# Patient Record
Sex: FEMALE | Race: WHITE | NOT HISPANIC OR LATINO | Employment: FULL TIME | URBAN - METROPOLITAN AREA
[De-identification: names, ages, dates, MRNs, and addresses within clinical notes are randomized per-mention and may not be internally consistent; named-entity substitution may affect disease eponyms.]

---

## 2020-03-16 ENCOUNTER — HOSPITAL ENCOUNTER (EMERGENCY)
Facility: HOSPITAL | Age: 40
Discharge: HOME | End: 2020-03-16
Attending: EMERGENCY MEDICINE

## 2020-03-16 VITALS
BODY MASS INDEX: 26.29 KG/M2 | WEIGHT: 133.94 LBS | SYSTOLIC BLOOD PRESSURE: 132 MMHG | RESPIRATION RATE: 18 BRPM | HEIGHT: 60 IN | HEART RATE: 71 BPM | OXYGEN SATURATION: 98 % | DIASTOLIC BLOOD PRESSURE: 71 MMHG | TEMPERATURE: 99 F

## 2020-03-16 DIAGNOSIS — R21 RASH: Primary | ICD-10-CM

## 2020-03-16 PROCEDURE — 99284 EMERGENCY DEPT VISIT MOD MDM: CPT

## 2020-03-16 RX ORDER — HYDROXYZINE HYDROCHLORIDE 25 MG/1
25 TABLET, FILM COATED ORAL EVERY 6 HOURS
Qty: 30 TABLET | Refills: 0 | Status: SHIPPED | OUTPATIENT
Start: 2020-03-16

## 2020-03-16 RX ORDER — METHYLPREDNISOLONE 4 MG/1
TABLET ORAL
Qty: 1 PACKAGE | Refills: 0 | Status: SHIPPED | OUTPATIENT
Start: 2020-03-16 | End: 2020-04-06

## 2020-03-16 NOTE — ED PROVIDER NOTES
Refer To    IMAGING REFER TO:  [facility],   [address] [place], [place]  Ph: [phone]     ORDER: CT CHEST W CON   Reason for Referral: Osteopenia (M85.80)     Ordered By: JIMBO MONTEMAYOR Required Performing Instructions: dx: f/u lung nodule   CT chest without contrast   [facility]     # of Visits: 1     Signatures   Electronically signed by : Ruth Paul, ; Aug 24 2018 12:34PM CST     SCRIBE #1 NOTE: I, Joyce Gage, am scribing for, and in the presence of, PAPA Whiteside. I have scribed the entire note.      History      Chief Complaint   Patient presents with    General Illness     pt c/o cough, congestion, fever (Tmax 101) that began Thursday, and rash that began today       Review of patient's allergies indicates:   Allergen Reactions    Olanzapine Swelling    Tizanidine Itching        HPI   HPI    3/16/2020, 4:23 PM   History obtained from the patient      History of Present Illness: Criselda Cannon is a 39 y.o. female patient with a PMHx of anxiety, bipolar disorder 1, and depression, who presents to the Emergency Department for and Itchy rash to the back, chest, and bilateral arms which onset x1 day PTA. Symptoms are constant and moderate in severity. No mitigating or exacerbating factors reported. No associated sxs reported. Patient denies any fever, chills, n/v/d, sore throat, congestion, cough, rhinorrhea, HA, and all other sxs at this time. No prior Tx reported. No further complaints or concerns at this time.       Arrival mode: Personal vehicle     PCP: Miguelangel Tamez Jr, MD       Past Medical History:  Past Medical History:   Diagnosis Date    Anxiety     Bipolar 1 disorder     Depression        Past Surgical History:  History reviewed. No pertinent past surgical history.     Family History:  History reviewed. No pertinent family history.     Social History:  Social History     Tobacco Use    Smoking status: Current Every Day Smoker     Packs/day: 1.00   Substance and Sexual Activity    Alcohol use: No    Drug use: Yes    Sexual activity: Yes       ROS   Review of Systems   Constitutional: Negative for chills and fever.   HENT: Negative for congestion, rhinorrhea and sore throat.    Respiratory: Negative for cough and shortness of breath.    Cardiovascular: Negative for chest pain.   Gastrointestinal: Negative for diarrhea, nausea and vomiting.   Genitourinary:  Negative for dysuria.   Musculoskeletal: Negative for back pain.   Skin: Positive for rash (back, chest, bilateral arms).   Neurological: Negative for weakness and headaches.   Hematological: Does not bruise/bleed easily.   All other systems reviewed and are negative.    Physical Exam      Initial Vitals [03/16/20 1617]   BP Pulse Resp Temp SpO2   132/71 71 18 98.9 °F (37.2 °C) 98 %      MAP       --          Physical Exam  Nursing Notes and Vital Signs Reviewed.   Constitutional: Patient is in no acute distress. Well-developed and well-nourished.  Head: Atraumatic. Normocephalic.  Eyes: PERRL. EOM intact. Conjunctivae are not pale. No scleral icterus.  ENT: Mucous membranes are moist. Oropharynx is clear and symmetric.    Neck: Supple. Full ROM. No lymphadenopathy.  Cardiovascular: Regular rate. Regular rhythm. No murmurs, rubs, or gallops. Distal pulses are 2+ and symmetric.  Pulmonary/Chest: No respiratory distress. Clear to auscultation bilaterally. No wheezing or rales.  Abdominal: Soft and non-distended.  There is no tenderness.  No rebound, guarding, or rigidity. Good bowel sounds.  Genitourinary: No CVA tenderness  Musculoskeletal: Moves all extremities. No obvious deformities. No edema. No calf tenderness.  Skin: Warm and dry. Pustular erythematous rash to back, chest, and bilateral arms.  Neurological:  Alert, awake, and appropriate.  Normal speech.  No acute focal neurological deficits are appreciated.  Psychiatric: Normal affect. Good eye contact. Appropriate in content.    ED Course    Procedures  ED Vital Signs:  Vitals:    03/16/20 1617   BP: 132/71   Pulse: 71   Resp: 18   Temp: 98.9 °F (37.2 °C)   TempSrc: Oral   SpO2: 98%   Weight: 60.7 kg (133 lb 14.9 oz)   Height: 5' (1.524 m)       Abnormal Lab Results:  Labs Reviewed   HIV 1 / 2 ANTIBODY        All Lab Results:  Results for orders placed or performed during the hospital encounter of 07/06/13   CBC auto differential   Result Value Ref Range     WBC 9.23 4.8 - 10.8 K/uL    RBC 4.69 4.00 - 5.40 M/uL    Hemoglobin 14.0 12.0 - 16.0 gm/dl    Hematocrit 40.3 37.0 - 48.5 %    Mean Corpuscular Volume 85.9 82 - 95 fL    Mean Corpuscular Hemoglobin 29.9 27 - 31 pg    Mean Corpuscular Hemoglobin Conc 34.7 32 - 36 %    RDW 12.8 11.5 - 14.5 %    Gran% 56.4 38 - 73 %    Lymph% 35.3 21 - 44 %    Mono% 6.9 0.0 - 7.4 %    Eosinophil% 1.2 0.0 - 4.2 %    Basophil% 0.2 0 - 1.9 %    Gran # (ANC) 5.2 1.8 - 7.7 K/uL    Lymph # 3.3 1 - 4.8 K/uL    Mono # 0.6 0.0 - 0.8 K/uL    Eos # 0.1 0 - 0.45 K/uL    Baso # 0.0 0.0 - 0.2 K/uL    Platelets 218 150 - 350 K/uL    MPV 12.2 9.2 - 12.9 fL   Comprehensive metabolic panel   Result Value Ref Range    Glucose 80 70 - 110 mg/dl    BUN, Bld 7 6 - 20 mg/dl    Creatinine 0.8 0.5 - 1.4 mg/dl    Calcium 10.2 8.7 - 10.5 mg/dl    Sodium 140 136 - 145 mmol/L    Potassium 2.9 (L) 3.5 - 5.1 mmol/L    Chloride 104 95 - 110 mmol/L    Total Protein 7.6 6.0 - 8.4 g/dL    Albumin 4.1 3.5 - 5.2 g/dl    Total Bilirubin 0.6 0.1 - 1.0 mg/dl    AST 8 (L) 10 - 40 U/L    Alkaline Phosphatase 83 55 - 135 U/L    CO2 22 (L) 23.0 - 29.0 mmol/L    ALT 6 (L) 10 - 44 U/L    Anion Gap 14 8 - 16 mmol/L    eGFR if non African American >60 >60 mL/min    eGFR if African American >60 >60 mL/min   TSH   Result Value Ref Range    TSH 1.01 0.4 - 4.0 uIU/ml   Urinalysis - clean catch   Result Value Ref Range    Color, UA YELLOW Yellow    Appearance, UA CLEAR Clear    Specific Gravity, UA 1.005 1.005 - 1.030    Leukocytes, UA 1+ (A) Negative    Nitrite, UA Negative Negative    pH, UA 6.5 4.5 - 8.0    Protein, UA Negative Negative mg/dl    Glucose, UA Negative Negative mg/dl    Ketones, UA Negative Negative mg/dl    Bilirubin (UA) Negative Negative    Urobilinogen, urine Negative <4 mg/dL    Occult Blood UA Trace (A) Negative   Drug screen panel, emergency   Result Value Ref Range    Opiates Urine Negative Negative    Cocaine, Urine Presumptive Positive (A) Negative    THC, UR  Negative Negative    Benzodiaz, Urine Negative Negative    Barbiturates, Urine Negative Negative    Amphetamines, Urine Negative Negative   Acetaminophen level   Result Value Ref Range    Acetaminophen (Tylenol), Serum <3.0 (L) 10.0 - 20.0 ug/ml   Salicylate level   Result Value Ref Range    Salicylate Lvl <5.0 (L) 15.0 - 30.0 mg/dl   Pregnancy, urine rapid   Result Value Ref Range    Preg Test, Ur Negative Negative   Ethanol   Result Value Ref Range    Alcohol, Medical, Serum <10 mg/dl   UA Microscopic   Result Value Ref Range    RBC, UA 1-3 0 - 4 /hpf    WBC, UA 5-10 (A) 0 - 5 /hpf    Bacteria Moderate (A) None-Occ    Squam Epithel, UA Moderate /hpf    Amorphous, UA Many (A) Few-Mod   Magnesium   Result Value Ref Range    Magnesium 2.3 1.6 - 2.6 mg/dl   Potassium   Result Value Ref Range    Potassium 4.1 3.5 - 5.1 mmol/L         Imaging Results:  Imaging Results    None                 The Emergency Provider reviewed the vital signs and test results, which are outlined above.    ED Discussion     4:37 PM: Reassessed pt at this time.  Pt states her condition has improved at this time. Discussed with pt all pertinent ED information and results. Discussed pt dx and plan of tx. Gave pt all f/u and return to the ED instructions. All questions and concerns were addressed at this time. Pt expresses understanding of information and instructions, and is comfortable with plan to discharge. Pt is stable for discharge.    I discussed with patient and/or family/caretaker that evaluation in the ED does not suggest any emergent or life threatening medical conditions requiring immediate intervention beyond what was provided in the ED, and I believe patient is safe for discharge.  Regardless, an unremarkable evaluation in the ED does not preclude the development or presence of a serious of life threatening condition. As such, patient was instructed to return immediately for any worsening or change in current symptoms.           ED  Medication(s):  Medications - No data to display    Follow-up Information     Miguelangel Tamez Jr, MD. Go in 2 days.    Specialty:  Family Medicine  Contact information:  [address] [facility]  [address]  [facility] [address] [phone]                   Patient's Medications   New Prescriptions    HYDROXYZINE HCL (ATARAX) 25 MG TABLET    Take 1 tablet (25 mg total) by mouth every 6 (six) hours.    METHYLPREDNISOLONE (MEDROL DOSEPACK) 4 MG TABLET    As directed   Previous Medications    ARIPIPRAZOLE (ABILIFY) 10 MG TAB    Take 5 mg by mouth once daily.    CLONAZEPAM (KLONOPIN) 1 MG TABLET    Take 1 mg by mouth 2 (two) times daily as needed.    MEDROXYPROGESTERONE ACETATE (DEPO-PROVERA IM)    Inject into the muscle.    SERTRALINE (ZOLOFT) 100 MG TABLET    Take 100 mg by mouth once daily.    TRAZODONE (DESYREL) 100 MG TABLET    Take 100 mg by mouth every evening.   Modified Medications    No medications on file   Discontinued Medications    No medications on file        Medication List      START taking these medications    hydroxyzine HCL 25 MG tablet  Commonly known as:  ATARAX  Take 1 tablet (25 mg total) by mouth every 6 (six) hours.     methylPREDNISolone 4 mg tablet  Commonly known as:  MEDROL DOSEPACK  As directed        ASK your doctor about these medications    ABILIFY 10 MG Tab  Generic drug:  ARIPiprazole     DEPO-PROVERA IM     KlonoPIN 1 MG tablet  Generic drug:  clonazePAM     sertraline 100 MG tablet  Commonly known as:  ZOLOFT     traZODone 100 MG tablet  Commonly known as:  DESYREL           Where to Get Your Medications      You can get these medications from any pharmacy    Bring a paper prescription for each of these medications  · hydroxyzine HCL 25 MG tablet  · methylPREDNISolone 4 mg tablet           Medical Decision Making    Medical Decision Making:   Clinical Tests:   Lab Tests: Ordered and Reviewed           Scribe Attestation:   Scribe #1: I performed the above scribed service and the  documentation accurately describes the services I performed. I attest to the accuracy of the note.    Attending:   Physician Attestation Statement for Scribe #1: I, PAPA Whiteside, personally performed the services described in this documentation, as scribed by Joyce Gage, in my presence, and it is both accurate and complete.          Clinical Impression       ICD-10-CM ICD-9-CM   1. Rash R21 782.1       Disposition:   Disposition: Discharged  Condition: Stable         PAPA Whiteside  03/16/20 1485

## 2021-12-30 ENCOUNTER — HOSPITAL ENCOUNTER (EMERGENCY)
Facility: HOSPITAL | Age: 41
Discharge: LEFT BEFORE DISCHARGE | End: 2021-12-30
Payer: MEDICAID

## 2021-12-30 VITALS
BODY MASS INDEX: 24.85 KG/M2 | DIASTOLIC BLOOD PRESSURE: 88 MMHG | TEMPERATURE: 98 F | RESPIRATION RATE: 12 BRPM | HEART RATE: 101 BPM | SYSTOLIC BLOOD PRESSURE: 150 MMHG | WEIGHT: 123.25 LBS | OXYGEN SATURATION: 98 % | HEIGHT: 59 IN

## 2021-12-30 DIAGNOSIS — M25.569 KNEE PAIN: ICD-10-CM

## 2021-12-30 PROCEDURE — 25000003 PHARM REV CODE 250: Performed by: NURSE PRACTITIONER

## 2021-12-30 PROCEDURE — 99283 EMERGENCY DEPT VISIT LOW MDM: CPT | Mod: 25

## 2021-12-30 RX ORDER — KETOROLAC TROMETHAMINE 10 MG/1
10 TABLET, FILM COATED ORAL
Status: COMPLETED | OUTPATIENT
Start: 2021-12-30 | End: 2021-12-30

## 2021-12-30 RX ADMIN — KETOROLAC TROMETHAMINE 10 MG: 10 TABLET, FILM COATED ORAL at 11:12

## 2021-12-31 NOTE — ED NOTES
Attempted to apply knee immobilizer to pt knee at this time she would not sit still to allow application. Another attempt will be made once med has time to work

## 2021-12-31 NOTE — ED NOTES
Attempted to reapply knee immobilizer and give pt crutches pt not in room other pt states pt left

## 2022-01-01 NOTE — ED PROVIDER NOTES
Encounter Date: 12/30/2021       History     Chief Complaint   Patient presents with    Fall     Fell down steps November 21rst. States that she is still having pain to right leg since and now wants to have it looked at.      Pt. C/o right knee pain after a fall several weeks ago.     The history is provided by the patient.   Fall  The accident occurred several weeks ago. The fall occurred while walking. She fell from an unknown height. She was ambulatory at the scene. There was no entrapment after the fall. Pertinent negatives include no back pain, no fever and no nausea. She has tried nothing for the symptoms.     Review of patient's allergies indicates:   Allergen Reactions    Olanzapine Swelling    Tizanidine Itching     Past Medical History:   Diagnosis Date    Anxiety     Bipolar 1 disorder     Depression      No past surgical history on file.  No family history on file.  Social History     Tobacco Use    Smoking status: Current Every Day Smoker     Packs/day: 1.00   Substance Use Topics    Alcohol use: No    Drug use: Yes     Review of Systems   Constitutional: Negative for fever.   HENT: Negative for sore throat.    Respiratory: Negative for shortness of breath.    Cardiovascular: Negative for chest pain.   Gastrointestinal: Negative for nausea.   Genitourinary: Negative for dysuria.   Musculoskeletal: Negative for back pain.   Skin: Negative for rash.   Neurological: Negative for weakness.   Hematological: Does not bruise/bleed easily.       Physical Exam     Initial Vitals [12/30/21 2035]   BP Pulse Resp Temp SpO2   (!) 150/88 101 12 97.9 °F (36.6 °C) 98 %      MAP       --         Physical Exam    Nursing note and vitals reviewed.  Constitutional: She appears well-developed and well-nourished.   HENT:   Head: Normocephalic and atraumatic.   Eyes: Conjunctivae are normal.   Neck:   Normal range of motion.  Cardiovascular: Normal rate.   Pulmonary/Chest: Breath sounds normal. No respiratory  distress. She has no wheezes.   Musculoskeletal:         General: Normal range of motion.      Cervical back: Normal range of motion.      Comments: Right leg ttp on the lateral joint line.  Antalgic gait.      Neurological: She is alert and oriented to person, place, and time.   Skin: Skin is warm and dry.   Psychiatric: She has a normal mood and affect. Her behavior is normal. Thought content normal.         ED Course   Procedures  Labs Reviewed - No data to display       Imaging Results    None          Medications   ketorolac tablet 10 mg (10 mg Oral Given 12/30/21 3250)                          Clinical Impression:   Final diagnoses:  [M25.569] Knee pain          ED Disposition Condition    Eloped               Bakari Lambert NP  01/01/22 0054

## 2022-12-07 ENCOUNTER — HOSPITAL ENCOUNTER (INPATIENT)
Facility: HOSPITAL | Age: 42
LOS: 3 days | DRG: 917 | End: 2022-12-10
Attending: FAMILY MEDICINE | Admitting: INTERNAL MEDICINE
Payer: MEDICAID

## 2022-12-07 DIAGNOSIS — E87.20 LACTIC ACIDOSIS: ICD-10-CM

## 2022-12-07 DIAGNOSIS — J69.0 ASPIRATION PNEUMONIA OF BOTH LUNGS DUE TO VOMIT, UNSPECIFIED PART OF LUNG: ICD-10-CM

## 2022-12-07 DIAGNOSIS — I46.9 CARDIAC ARREST: ICD-10-CM

## 2022-12-07 DIAGNOSIS — R94.31 PROLONGED Q-T INTERVAL ON ECG: ICD-10-CM

## 2022-12-07 DIAGNOSIS — I50.30 (HFPEF) HEART FAILURE WITH PRESERVED EJECTION FRACTION: ICD-10-CM

## 2022-12-07 DIAGNOSIS — G93.1 ANOXIC BRAIN INJURY: Primary | ICD-10-CM

## 2022-12-07 LAB
ABS NEUT CALC (OHS): 4.51 X10(3)/MCL (ref 2.1–9.2)
ALBUMIN SERPL-MCNC: 2.8 GM/DL (ref 3.5–5)
ALBUMIN SERPL-MCNC: 3.2 GM/DL (ref 3.5–5)
ALBUMIN/GLOB SERPL: 1.4 RATIO (ref 1.1–2)
ALBUMIN/GLOB SERPL: 1.5 RATIO (ref 1.1–2)
ALP SERPL-CCNC: 147 UNIT/L (ref 40–150)
ALP SERPL-CCNC: 99 UNIT/L (ref 40–150)
ALT SERPL-CCNC: 152 UNIT/L (ref 0–55)
ALT SERPL-CCNC: 259 UNIT/L (ref 0–55)
AMPHET UR QL SCN: POSITIVE
ANION GAP SERPL CALC-SCNC: 10 MEQ/L
ANION GAP SERPL CALC-SCNC: 12 MEQ/L
ANION GAP SERPL CALC-SCNC: 13 MEQ/L
ANISOCYTOSIS BLD QL SMEAR: ABNORMAL
APPEARANCE UR: CLEAR
AST SERPL-CCNC: 225 UNIT/L (ref 5–34)
AST SERPL-CCNC: 460 UNIT/L (ref 5–34)
B-HCG SERPL QL: NEGATIVE
BACTERIA #/AREA URNS AUTO: ABNORMAL /HPF
BARBITURATE SCN PRESENT UR: NEGATIVE
BENZODIAZ UR QL SCN: NEGATIVE
BILIRUB UR QL STRIP.AUTO: NEGATIVE MG/DL
BILIRUBIN DIRECT+TOT PNL SERPL-MCNC: 0.8 MG/DL
BILIRUBIN DIRECT+TOT PNL SERPL-MCNC: 0.8 MG/DL
BSA FOR ECHO PROCEDURE: 1.48 M2
BUN SERPL-MCNC: 18 MG/DL (ref 7–18.7)
BUN SERPL-MCNC: 21.7 MG/DL (ref 7–18.7)
BUN SERPL-MCNC: 22.3 MG/DL (ref 7–18.7)
BUN SERPL-MCNC: 22.5 MG/DL (ref 7–18.7)
BUN SERPL-MCNC: 22.6 MG/DL (ref 7–18.7)
CALCIUM SERPL-MCNC: 7.1 MG/DL (ref 8.4–10.2)
CALCIUM SERPL-MCNC: 7.2 MG/DL (ref 8.4–10.2)
CALCIUM SERPL-MCNC: 7.3 MG/DL (ref 8.4–10.2)
CALCIUM SERPL-MCNC: 7.4 MG/DL (ref 8.4–10.2)
CALCIUM SERPL-MCNC: 7.6 MG/DL (ref 8.4–10.2)
CANNABINOIDS UR QL SCN: NEGATIVE
CHLORIDE SERPL-SCNC: 106 MMOL/L (ref 98–107)
CHLORIDE SERPL-SCNC: 107 MMOL/L (ref 98–107)
CHLORIDE SERPL-SCNC: 108 MMOL/L (ref 98–107)
CHLORIDE SERPL-SCNC: 109 MMOL/L (ref 98–107)
CHLORIDE SERPL-SCNC: 110 MMOL/L (ref 98–107)
CK MB SERPL-MCNC: 13 NG/ML
CK SERPL-CCNC: 2171 U/L (ref 29–168)
CK SERPL-CCNC: 2526 U/L (ref 29–168)
CK SERPL-CCNC: 2648 U/L (ref 29–168)
CO2 SERPL-SCNC: 19 MMOL/L (ref 22–29)
CO2 SERPL-SCNC: 19 MMOL/L (ref 22–29)
CO2 SERPL-SCNC: 22 MMOL/L (ref 22–29)
COCAINE UR QL SCN: POSITIVE
COLOR UR AUTO: YELLOW
CORRECTED TEMPERATURE (PCO2): 104 MMHG (ref 20–50)
CORRECTED TEMPERATURE (PCO2): 54 MMHG (ref 20–50)
CORRECTED TEMPERATURE (PCO2): 95 MMHG (ref 20–50)
CORRECTED TEMPERATURE (PH): 7.02 (ref 7.3–7.6)
CORRECTED TEMPERATURE (PH): 7.06 (ref 7.3–7.6)
CORRECTED TEMPERATURE (PH): 7.27 (ref 7.3–7.6)
CORRECTED TEMPERATURE (PO2): 101 MMHG (ref 75–100)
CORRECTED TEMPERATURE (PO2): 478 MMHG (ref 75–100)
CORRECTED TEMPERATURE (PO2): 94 MMHG (ref 75–100)
CREAT SERPL-MCNC: 0.57 MG/DL (ref 0.55–1.02)
CREAT SERPL-MCNC: 0.66 MG/DL (ref 0.55–1.02)
CREAT SERPL-MCNC: 0.68 MG/DL (ref 0.55–1.02)
CREAT SERPL-MCNC: 0.74 MG/DL (ref 0.55–1.02)
CREAT SERPL-MCNC: 0.89 MG/DL (ref 0.55–1.02)
CREAT/UREA NIT SERPL: 30
CREAT/UREA NIT SERPL: 32
CREAT/UREA NIT SERPL: 34
EJECTION FRACTION: 55 %
ERYTHROCYTE [DISTWIDTH] IN BLOOD BY AUTOMATED COUNT: 12.3 % (ref 11.5–17)
FENTANYL UR QL SCN: POSITIVE
GFR SERPLBLD CREATININE-BSD FMLA CKD-EPI: >60 MLS/MIN/1.73/M2
GLOBULIN SER-MCNC: 1.9 GM/DL (ref 2.4–3.5)
GLOBULIN SER-MCNC: 2.3 GM/DL (ref 2.4–3.5)
GLUCOSE SERPL-MCNC: 107 MG/DL (ref 74–100)
GLUCOSE SERPL-MCNC: 127 MG/DL (ref 74–100)
GLUCOSE SERPL-MCNC: 153 MG/DL (ref 74–100)
GLUCOSE SERPL-MCNC: 195 MG/DL (ref 74–100)
GLUCOSE SERPL-MCNC: 217 MG/DL (ref 74–100)
GLUCOSE SERPL-MCNC: 217 MG/DL (ref 74–100)
GLUCOSE UR QL STRIP.AUTO: ABNORMAL MG/DL
HCO3 UR-SCNC: 24.5 MMOL/L (ref 22–26)
HCO3 UR-SCNC: 24.8 MMOL/L (ref 22–26)
HCO3 UR-SCNC: 29.4 MMOL/L (ref 22–26)
HCT VFR BLD AUTO: 26.7 % (ref 37–47)
HGB BLD-MCNC: 10.8 G/DL (ref 12–18)
HGB BLD-MCNC: 11.5 G/DL (ref 12–18)
HGB BLD-MCNC: 8.5 G/DL (ref 12–18)
HGB BLD-MCNC: 8.8 GM/DL (ref 12–16)
HYALINE CASTS #/AREA URNS LPF: ABNORMAL /LPF
IMM GRANULOCYTES # BLD AUTO: 0.06 X10(3)/MCL (ref 0–0.04)
IMM GRANULOCYTES NFR BLD AUTO: 0.6 %
KETONES UR QL STRIP.AUTO: ABNORMAL MG/DL
LACTATE SERPL-SCNC: 1.7 MMOL/L (ref 0.5–2.2)
LACTATE SERPL-SCNC: 8.1 MMOL/L (ref 0.5–2.2)
LEUKOCYTE ESTERASE UR QL STRIP.AUTO: NEGATIVE UNIT/L
LYMPHOCYTES NFR BLD MANUAL: 4.9 X10(3)/MCL
LYMPHOCYTES NFR BLD MANUAL: 50 % (ref 13–40)
MAGNESIUM SERPL-MCNC: 1.5 MG/DL (ref 1.6–2.6)
MAGNESIUM SERPL-MCNC: 1.7 MG/DL (ref 1.6–2.6)
MAGNESIUM SERPL-MCNC: 1.8 MG/DL (ref 1.6–2.6)
MAGNESIUM SERPL-MCNC: 2.4 MG/DL (ref 1.6–2.6)
MAGNESIUM SERPL-MCNC: 2.5 MG/DL (ref 1.6–2.6)
MCH RBC QN AUTO: 30.6 PG (ref 27–31)
MCHC RBC AUTO-ENTMCNC: 33 MG/DL (ref 33–36)
MCV RBC AUTO: 92.7 FL (ref 80–94)
MDMA UR QL SCN: NEGATIVE
MONOCYTES NFR BLD MANUAL: 0.39 X10(3)/MCL (ref 0.1–1.3)
MONOCYTES NFR BLD MANUAL: 4 % (ref 2–11)
MUCOUS THREADS URNS QL MICRO: ABNORMAL /LPF
NEUTROPHILS NFR BLD MANUAL: 46 % (ref 47–80)
NITRITE UR QL STRIP.AUTO: NEGATIVE
NRBC BLD AUTO-RTO: 0 %
OPIATES UR QL SCN: POSITIVE
PCO2 BLDA: 104 MMHG (ref 20–50)
PCO2 BLDA: 54 MMHG (ref 20–50)
PCO2 BLDA: 95 MMHG (ref 20–50)
PCP UR QL: NEGATIVE
PH SMN: 7.02 [PH] (ref 7.3–7.6)
PH SMN: 7.06 [PH] (ref 7.3–7.6)
PH SMN: 7.27 [PH] (ref 7.3–7.6)
PH UR STRIP.AUTO: 5 [PH]
PH UR: 5 [PH] (ref 3–11)
PHOSPHATE SERPL-MCNC: 2 MG/DL (ref 2.3–4.7)
PHOSPHATE SERPL-MCNC: 2.2 MG/DL (ref 2.3–4.7)
PHOSPHATE SERPL-MCNC: 3.6 MG/DL (ref 2.3–4.7)
PHOSPHATE SERPL-MCNC: 4.7 MG/DL (ref 2.3–4.7)
PLATELET # BLD AUTO: 239 X10(3)/MCL (ref 130–400)
PLATELET # BLD EST: NORMAL 10*3/UL
PMV BLD AUTO: 11.5 FL (ref 7.4–10.4)
PO2 BLDA: 101 MMHG (ref 75–100)
PO2 BLDA: 478 MMHG (ref 75–100)
PO2 BLDA: 94 MMHG (ref 75–100)
POC BASE DEFICIT: -2.6 MMOL/L (ref -2–2)
POC BASE DEFICIT: -2.8 MMOL/L (ref -2–2)
POC BASE DEFICIT: -6.9 MMOL/L (ref -2–2)
POC COHB: 2 % (ref 0.5–1.5)
POC COHB: 2.4 % (ref 0.5–1.5)
POC COHB: 2.4 % (ref 0.5–1.5)
POC METHB: 0.4 % (ref 0–1.5)
POC METHB: 0.6 % (ref 0–1.5)
POC METHB: 0.7 % (ref 0–1.5)
POC O2HB: 95.6 % (ref 94–100)
POC O2HB: 96.8 % (ref 94–100)
POC O2HB: 96.8 % (ref 94–100)
POC PERFORMED BY: ABNORMAL
POC SATURATED O2: 100 % (ref 90–100)
POC SATURATED O2: 94.6 % (ref 90–100)
POC SATURATED O2: 96.2 % (ref 90–100)
POC TEMPERATURE: 37 C
POC TEMPERATURE: 37 C
POC TEMPERATURE: 37 °C
POCT GLUCOSE: 120 MG/DL (ref 70–110)
POCT GLUCOSE: >500 MG/DL (ref 70–110)
POTASSIUM SERPL-SCNC: 2.5 MMOL/L (ref 3.5–5.1)
POTASSIUM SERPL-SCNC: 3.4 MMOL/L (ref 3.5–5.1)
POTASSIUM SERPL-SCNC: 3.4 MMOL/L (ref 3.5–5.1)
POTASSIUM SERPL-SCNC: 3.6 MMOL/L (ref 3.5–5.1)
POTASSIUM SERPL-SCNC: 3.7 MMOL/L (ref 3.5–5.1)
PROT SERPL-MCNC: 4.7 GM/DL (ref 6.4–8.3)
PROT SERPL-MCNC: 5.5 GM/DL (ref 6.4–8.3)
PROT UR QL STRIP.AUTO: ABNORMAL MG/DL
RBC # BLD AUTO: 2.88 X10(6)/MCL (ref 4.2–5.4)
RBC #/AREA URNS AUTO: >100 /HPF
RBC MORPH BLD: ABNORMAL
RBC UR QL AUTO: ABNORMAL UNIT/L
SODIUM SERPL-SCNC: 140 MMOL/L (ref 136–145)
SODIUM SERPL-SCNC: 140 MMOL/L (ref 136–145)
SODIUM SERPL-SCNC: 141 MMOL/L (ref 136–145)
SODIUM SERPL-SCNC: 141 MMOL/L (ref 136–145)
SODIUM SERPL-SCNC: 146 MMOL/L (ref 136–145)
SP GR UR STRIP.AUTO: 1.01
SPECIFIC GRAVITY, URINE AUTO (.000) (OHS): 1.01 (ref 1–1.03)
SPECIMEN SOURCE: ABNORMAL
SQUAMOUS #/AREA URNS LPF: ABNORMAL /HPF
TROPONIN I SERPL-MCNC: 0.05 NG/ML (ref 0–0.04)
TROPONIN I SERPL-MCNC: 0.12 NG/ML (ref 0–0.04)
TROPONIN I SERPL-MCNC: 0.22 NG/ML (ref 0–0.04)
TROPONIN I SERPL-MCNC: <0.01 NG/ML (ref 0–0.04)
UNIDENT CRYS #/AREA URNS HPF: ABNORMAL /HPF
UROBILINOGEN UR STRIP-ACNC: NORMAL MG/DL
WBC # SPEC AUTO: 9.8 X10(3)/MCL (ref 4.5–11.5)
WBC #/AREA URNS AUTO: ABNORMAL /HPF

## 2022-12-07 PROCEDURE — 84100 ASSAY OF PHOSPHORUS: CPT | Performed by: STUDENT IN AN ORGANIZED HEALTH CARE EDUCATION/TRAINING PROGRAM

## 2022-12-07 PROCEDURE — 94002 VENT MGMT INPAT INIT DAY: CPT

## 2022-12-07 PROCEDURE — 96374 THER/PROPH/DIAG INJ IV PUSH: CPT

## 2022-12-07 PROCEDURE — 82962 GLUCOSE BLOOD TEST: CPT

## 2022-12-07 PROCEDURE — 82553 CREATINE MB FRACTION: CPT | Performed by: FAMILY MEDICINE

## 2022-12-07 PROCEDURE — 94761 N-INVAS EAR/PLS OXIMETRY MLT: CPT

## 2022-12-07 PROCEDURE — 84703 CHORIONIC GONADOTROPIN ASSAY: CPT | Performed by: STUDENT IN AN ORGANIZED HEALTH CARE EDUCATION/TRAINING PROGRAM

## 2022-12-07 PROCEDURE — 85027 COMPLETE CBC AUTOMATED: CPT | Performed by: FAMILY MEDICINE

## 2022-12-07 PROCEDURE — 63600175 PHARM REV CODE 636 W HCPCS: Performed by: STUDENT IN AN ORGANIZED HEALTH CARE EDUCATION/TRAINING PROGRAM

## 2022-12-07 PROCEDURE — 99223 1ST HOSP IP/OBS HIGH 75: CPT | Mod: ,,, | Performed by: INTERNAL MEDICINE

## 2022-12-07 PROCEDURE — 36415 COLL VENOUS BLD VENIPUNCTURE: CPT | Performed by: INTERNAL MEDICINE

## 2022-12-07 PROCEDURE — 95822 EEG COMA OR SLEEP ONLY: CPT | Mod: 26,,, | Performed by: PSYCHIATRY & NEUROLOGY

## 2022-12-07 PROCEDURE — 20000000 HC ICU ROOM

## 2022-12-07 PROCEDURE — C1751 CATH, INF, PER/CENT/MIDLINE: HCPCS

## 2022-12-07 PROCEDURE — 83520 IMMUNOASSAY QUANT NOS NONAB: CPT | Performed by: STUDENT IN AN ORGANIZED HEALTH CARE EDUCATION/TRAINING PROGRAM

## 2022-12-07 PROCEDURE — 63600175 PHARM REV CODE 636 W HCPCS

## 2022-12-07 PROCEDURE — 63600175 PHARM REV CODE 636 W HCPCS: Performed by: FAMILY MEDICINE

## 2022-12-07 PROCEDURE — 87040 BLOOD CULTURE FOR BACTERIA: CPT | Performed by: FAMILY MEDICINE

## 2022-12-07 PROCEDURE — 93005 ELECTROCARDIOGRAM TRACING: CPT

## 2022-12-07 PROCEDURE — 80307 DRUG TEST PRSMV CHEM ANLYZR: CPT | Performed by: FAMILY MEDICINE

## 2022-12-07 PROCEDURE — 83605 ASSAY OF LACTIC ACID: CPT | Performed by: FAMILY MEDICINE

## 2022-12-07 PROCEDURE — 31500 INSERT EMERGENCY AIRWAY: CPT

## 2022-12-07 PROCEDURE — 82550 ASSAY OF CK (CPK): CPT | Performed by: FAMILY MEDICINE

## 2022-12-07 PROCEDURE — 25000003 PHARM REV CODE 250: Performed by: STUDENT IN AN ORGANIZED HEALTH CARE EDUCATION/TRAINING PROGRAM

## 2022-12-07 PROCEDURE — 99900035 HC TECH TIME PER 15 MIN (STAT)

## 2022-12-07 PROCEDURE — 81001 URINALYSIS AUTO W/SCOPE: CPT | Performed by: STUDENT IN AN ORGANIZED HEALTH CARE EDUCATION/TRAINING PROGRAM

## 2022-12-07 PROCEDURE — 84484 ASSAY OF TROPONIN QUANT: CPT | Performed by: FAMILY MEDICINE

## 2022-12-07 PROCEDURE — 82550 ASSAY OF CK (CPK): CPT | Performed by: STUDENT IN AN ORGANIZED HEALTH CARE EDUCATION/TRAINING PROGRAM

## 2022-12-07 PROCEDURE — 80053 COMPREHEN METABOLIC PANEL: CPT | Performed by: FAMILY MEDICINE

## 2022-12-07 PROCEDURE — 95822 PR EEG,COMA/SLEEP RECORD ONLY: ICD-10-PCS | Mod: 26,,, | Performed by: PSYCHIATRY & NEUROLOGY

## 2022-12-07 PROCEDURE — 25000003 PHARM REV CODE 250

## 2022-12-07 PROCEDURE — 36569 INSJ PICC 5 YR+ W/O IMAGING: CPT

## 2022-12-07 PROCEDURE — 96361 HYDRATE IV INFUSION ADD-ON: CPT

## 2022-12-07 PROCEDURE — 80053 COMPREHEN METABOLIC PANEL: CPT | Performed by: STUDENT IN AN ORGANIZED HEALTH CARE EDUCATION/TRAINING PROGRAM

## 2022-12-07 PROCEDURE — 51702 INSERT TEMP BLADDER CATH: CPT

## 2022-12-07 PROCEDURE — 82803 BLOOD GASES ANY COMBINATION: CPT

## 2022-12-07 PROCEDURE — 36600 WITHDRAWAL OF ARTERIAL BLOOD: CPT

## 2022-12-07 PROCEDURE — 63600175 PHARM REV CODE 636 W HCPCS: Performed by: INTERNAL MEDICINE

## 2022-12-07 PROCEDURE — 95816 EEG AWAKE AND DROWSY: CPT

## 2022-12-07 PROCEDURE — 83735 ASSAY OF MAGNESIUM: CPT | Performed by: FAMILY MEDICINE

## 2022-12-07 PROCEDURE — 99900026 HC AIRWAY MAINTENANCE (STAT)

## 2022-12-07 PROCEDURE — 87088 URINE BACTERIA CULTURE: CPT | Performed by: STUDENT IN AN ORGANIZED HEALTH CARE EDUCATION/TRAINING PROGRAM

## 2022-12-07 PROCEDURE — 36415 COLL VENOUS BLD VENIPUNCTURE: CPT | Performed by: STUDENT IN AN ORGANIZED HEALTH CARE EDUCATION/TRAINING PROGRAM

## 2022-12-07 PROCEDURE — 25000003 PHARM REV CODE 250: Performed by: FAMILY MEDICINE

## 2022-12-07 PROCEDURE — 82947 ASSAY GLUCOSE BLOOD QUANT: CPT | Performed by: STUDENT IN AN ORGANIZED HEALTH CARE EDUCATION/TRAINING PROGRAM

## 2022-12-07 PROCEDURE — 27000221 HC OXYGEN, UP TO 24 HOURS

## 2022-12-07 PROCEDURE — 83735 ASSAY OF MAGNESIUM: CPT | Performed by: STUDENT IN AN ORGANIZED HEALTH CARE EDUCATION/TRAINING PROGRAM

## 2022-12-07 PROCEDURE — 99292 CRITICAL CARE ADDL 30 MIN: CPT

## 2022-12-07 PROCEDURE — 25000003 PHARM REV CODE 250: Performed by: INTERNAL MEDICINE

## 2022-12-07 PROCEDURE — 27200966 HC CLOSED SUCTION SYSTEM

## 2022-12-07 PROCEDURE — 99291 CRITICAL CARE FIRST HOUR: CPT | Mod: 25

## 2022-12-07 PROCEDURE — 84484 ASSAY OF TROPONIN QUANT: CPT | Performed by: STUDENT IN AN ORGANIZED HEALTH CARE EDUCATION/TRAINING PROGRAM

## 2022-12-07 PROCEDURE — A4216 STERILE WATER/SALINE, 10 ML: HCPCS | Performed by: STUDENT IN AN ORGANIZED HEALTH CARE EDUCATION/TRAINING PROGRAM

## 2022-12-07 PROCEDURE — 99223 PR INITIAL HOSPITAL CARE,LEVL III: ICD-10-PCS | Mod: ,,, | Performed by: INTERNAL MEDICINE

## 2022-12-07 RX ORDER — SODIUM CHLORIDE 9 MG/ML
INJECTION, SOLUTION INTRAVENOUS CONTINUOUS
Status: DISCONTINUED | OUTPATIENT
Start: 2022-12-07 | End: 2022-12-07

## 2022-12-07 RX ORDER — SODIUM CHLORIDE 0.9 % (FLUSH) 0.9 %
10 SYRINGE (ML) INJECTION
Status: DISCONTINUED | OUTPATIENT
Start: 2022-12-07 | End: 2022-12-12 | Stop reason: HOSPADM

## 2022-12-07 RX ORDER — KETAMINE HCL IN 0.9 % NACL 50 MG/5 ML
100 SYRINGE (ML) INTRAVENOUS
Status: COMPLETED | OUTPATIENT
Start: 2022-12-07 | End: 2022-12-07

## 2022-12-07 RX ORDER — LEVETIRACETAM 5 MG/ML
1000 INJECTION INTRAVASCULAR
Status: COMPLETED | OUTPATIENT
Start: 2022-12-07 | End: 2022-12-07

## 2022-12-07 RX ORDER — MUPIROCIN 20 MG/G
OINTMENT TOPICAL 2 TIMES DAILY
Status: COMPLETED | OUTPATIENT
Start: 2022-12-07 | End: 2022-12-11

## 2022-12-07 RX ORDER — PROPOFOL 10 MG/ML
0-50 INJECTION, EMULSION INTRAVENOUS CONTINUOUS
Status: DISCONTINUED | OUTPATIENT
Start: 2022-12-07 | End: 2022-12-07

## 2022-12-07 RX ORDER — MAGNESIUM SULFATE HEPTAHYDRATE 40 MG/ML
0.5 INJECTION, SOLUTION INTRAVENOUS CONTINUOUS
Status: DISCONTINUED | OUTPATIENT
Start: 2022-12-07 | End: 2022-12-07

## 2022-12-07 RX ORDER — KETAMINE HCL IN 0.9 % NACL 50 MG/5 ML
SYRINGE (ML) INTRAVENOUS
Status: COMPLETED
Start: 2022-12-07 | End: 2022-12-07

## 2022-12-07 RX ORDER — SUCCINYLCHOLINE CHLORIDE 20 MG/ML
80 INJECTION INTRAMUSCULAR; INTRAVENOUS
Status: COMPLETED | OUTPATIENT
Start: 2022-12-07 | End: 2022-12-07

## 2022-12-07 RX ORDER — LEVETIRACETAM 5 MG/ML
1000 INJECTION INTRAVASCULAR EVERY 12 HOURS
Status: DISCONTINUED | OUTPATIENT
Start: 2022-12-07 | End: 2022-12-12 | Stop reason: HOSPADM

## 2022-12-07 RX ORDER — SUCCINYLCHOLINE CHLORIDE 20 MG/ML
INJECTION INTRAMUSCULAR; INTRAVENOUS
Status: COMPLETED
Start: 2022-12-07 | End: 2022-12-07

## 2022-12-07 RX ORDER — ENOXAPARIN SODIUM 100 MG/ML
40 INJECTION SUBCUTANEOUS EVERY 24 HOURS
Status: DISCONTINUED | OUTPATIENT
Start: 2022-12-07 | End: 2022-12-12 | Stop reason: HOSPADM

## 2022-12-07 RX ORDER — POTASSIUM CHLORIDE 7.45 MG/ML
10 INJECTION INTRAVENOUS
Status: COMPLETED | OUTPATIENT
Start: 2022-12-07 | End: 2022-12-07

## 2022-12-07 RX ORDER — MAGNESIUM SULFATE HEPTAHYDRATE 40 MG/ML
2 INJECTION, SOLUTION INTRAVENOUS ONCE
Status: COMPLETED | OUTPATIENT
Start: 2022-12-07 | End: 2022-12-07

## 2022-12-07 RX ORDER — FAMOTIDINE 10 MG/ML
20 INJECTION INTRAVENOUS 2 TIMES DAILY
Status: DISCONTINUED | OUTPATIENT
Start: 2022-12-07 | End: 2022-12-12 | Stop reason: HOSPADM

## 2022-12-07 RX ORDER — SODIUM CHLORIDE 0.9 % (FLUSH) 0.9 %
10 SYRINGE (ML) INJECTION EVERY 6 HOURS
Status: DISCONTINUED | OUTPATIENT
Start: 2022-12-07 | End: 2022-12-12 | Stop reason: HOSPADM

## 2022-12-07 RX ORDER — SODIUM CHLORIDE, SODIUM LACTATE, POTASSIUM CHLORIDE, CALCIUM CHLORIDE 600; 310; 30; 20 MG/100ML; MG/100ML; MG/100ML; MG/100ML
INJECTION, SOLUTION INTRAVENOUS CONTINUOUS
Status: DISCONTINUED | OUTPATIENT
Start: 2022-12-07 | End: 2022-12-08

## 2022-12-07 RX ORDER — PROPOFOL 10 MG/ML
0-50 INJECTION, EMULSION INTRAVENOUS CONTINUOUS
Status: DISCONTINUED | OUTPATIENT
Start: 2022-12-07 | End: 2022-12-12 | Stop reason: HOSPADM

## 2022-12-07 RX ADMIN — VANCOMYCIN HYDROCHLORIDE 1250 MG: 1 INJECTION, POWDER, LYOPHILIZED, FOR SOLUTION INTRAVENOUS at 06:12

## 2022-12-07 RX ADMIN — LEVETIRACETAM INJECTION 1000 MG: 5 INJECTION INTRAVENOUS at 08:12

## 2022-12-07 RX ADMIN — PIPERACILLIN AND TAZOBACTAM 4.5 G: 4; .5 INJECTION, POWDER, LYOPHILIZED, FOR SOLUTION INTRAVENOUS; PARENTERAL at 07:12

## 2022-12-07 RX ADMIN — POTASSIUM CHLORIDE 10 MEQ: 7.46 INJECTION, SOLUTION INTRAVENOUS at 09:12

## 2022-12-07 RX ADMIN — LEVETIRACETAM INJECTION 1000 MG: 5 INJECTION INTRAVENOUS at 04:12

## 2022-12-07 RX ADMIN — Medication 100 MG: at 05:12

## 2022-12-07 RX ADMIN — PROPOFOL 5 MCG/KG/MIN: 10 INJECTION, EMULSION INTRAVENOUS at 12:12

## 2022-12-07 RX ADMIN — MAGNESIUM SULFATE HEPTAHYDRATE 2 G: 40 INJECTION, SOLUTION INTRAVENOUS at 07:12

## 2022-12-07 RX ADMIN — POTASSIUM CHLORIDE 10 MEQ: 7.46 INJECTION, SOLUTION INTRAVENOUS at 11:12

## 2022-12-07 RX ADMIN — SODIUM CHLORIDE 2000 ML: 9 INJECTION, SOLUTION INTRAVENOUS at 02:12

## 2022-12-07 RX ADMIN — FAMOTIDINE 20 MG: 10 INJECTION, SOLUTION INTRAVENOUS at 08:12

## 2022-12-07 RX ADMIN — PIPERACILLIN AND TAZOBACTAM 4.5 G: 4; .5 INJECTION, POWDER, LYOPHILIZED, FOR SOLUTION INTRAVENOUS; PARENTERAL at 01:12

## 2022-12-07 RX ADMIN — PROPOFOL 40 MCG/KG/MIN: 10 INJECTION, EMULSION INTRAVENOUS at 07:12

## 2022-12-07 RX ADMIN — SODIUM CHLORIDE, PRESERVATIVE FREE 10 ML: 5 INJECTION INTRAVENOUS at 11:12

## 2022-12-07 RX ADMIN — SODIUM CHLORIDE, PRESERVATIVE FREE 10 ML: 5 INJECTION INTRAVENOUS at 05:12

## 2022-12-07 RX ADMIN — PIPERACILLIN AND TAZOBACTAM 4.5 G: 4; .5 INJECTION, POWDER, LYOPHILIZED, FOR SOLUTION INTRAVENOUS; PARENTERAL at 09:12

## 2022-12-07 RX ADMIN — SUCCINYLCHOLINE CHLORIDE 80 MG: 20 INJECTION, SOLUTION INTRAMUSCULAR; INTRAVENOUS at 05:12

## 2022-12-07 RX ADMIN — ENOXAPARIN SODIUM 40 MG: 40 INJECTION SUBCUTANEOUS at 05:12

## 2022-12-07 RX ADMIN — MUPIROCIN: 20 OINTMENT TOPICAL at 09:12

## 2022-12-07 RX ADMIN — SUCCINYLCHOLINE CHLORIDE 80 MG: 20 INJECTION INTRAMUSCULAR; INTRAVENOUS at 05:12

## 2022-12-07 RX ADMIN — SODIUM BICARBONATE: 84 INJECTION, SOLUTION INTRAVENOUS at 06:12

## 2022-12-07 RX ADMIN — POTASSIUM CHLORIDE 10 MEQ: 7.46 INJECTION, SOLUTION INTRAVENOUS at 07:12

## 2022-12-07 RX ADMIN — POTASSIUM PHOSPHATE, MONOBASIC AND POTASSIUM PHOSPHATE, DIBASIC 15 MMOL: 224; 236 INJECTION, SOLUTION, CONCENTRATE INTRAVENOUS at 07:12

## 2022-12-07 RX ADMIN — VANCOMYCIN HYDROCHLORIDE 750 MG: 750 INJECTION, POWDER, LYOPHILIZED, FOR SOLUTION INTRAVENOUS at 05:12

## 2022-12-07 RX ADMIN — POTASSIUM CHLORIDE 10 MEQ: 7.46 INJECTION, SOLUTION INTRAVENOUS at 10:12

## 2022-12-07 RX ADMIN — SODIUM CHLORIDE, POTASSIUM CHLORIDE, SODIUM LACTATE AND CALCIUM CHLORIDE: 600; 310; 30; 20 INJECTION, SOLUTION INTRAVENOUS at 06:12

## 2022-12-07 RX ADMIN — POTASSIUM CHLORIDE 10 MEQ: 7.46 INJECTION, SOLUTION INTRAVENOUS at 06:12

## 2022-12-07 RX ADMIN — POTASSIUM CHLORIDE 10 MEQ: 7.46 INJECTION, SOLUTION INTRAVENOUS at 04:12

## 2022-12-07 RX ADMIN — MUPIROCIN: 20 OINTMENT TOPICAL at 08:12

## 2022-12-07 RX ADMIN — POTASSIUM CHLORIDE 10 MEQ: 7.46 INJECTION, SOLUTION INTRAVENOUS at 08:12

## 2022-12-07 RX ADMIN — SODIUM CHLORIDE: 9 INJECTION, SOLUTION INTRAVENOUS at 02:12

## 2022-12-07 RX ADMIN — NOREPINEPHRINE BITARTRATE 0.02 MCG/KG/MIN: 1 INJECTION, SOLUTION, CONCENTRATE INTRAVENOUS at 02:12

## 2022-12-07 NOTE — PROGRESS NOTES
Pharmacokinetic Initial Assessment: IV Vancomycin    Assessment/Plan:    Initiate intravenous vancomycin with loading dose of 750 mg once followed by a maintenance dose of vancomycin 750 mg IV every 12 hours  Desired empiric serum trough concentration is 10 to 20 mcg/mL  Draw vancomycin random level on 12/08 at 1700.  Pharmacy will continue to follow and monitor vancomycin.      Please contact pharmacy at extension [phone] with any questions regarding this assessment.     Thank you for the consult,   Bryant Potts       Patient brief summary:  Criselda Cannon is a 42 y.o. female initiated on antimicrobial therapy with IV Vancomycin for treatment of suspected lower respiratory infection    Drug Allergies:   Review of patient's allergies indicates:   Allergen Reactions    Olanzapine Swelling    Tizanidine Itching       Actual Body Weight:   49.9 kg    Renal Function:   Estimated Creatinine Clearance: 87.5 mL/min (based on SCr of 0.66 mg/dL).,       CBC (last 72 hours):  Recent Labs   Lab Result Units 12/07/22  0235   WBC x10(3)/mcL 9.8   Hgb gm/dL 8.8*   Hct % 26.7*   Platelet x10(3)/mcL 239   Monocyte Man % 4       Metabolic Panel (last 72 hours):  Recent Labs   Lab Result Units 12/07/22  0235 12/07/22  0445 12/07/22  0608 12/07/22  0624 12/07/22  0956 12/07/22  1209   Sodium Level mmol/L 146*  --  140  --  140 141   Potassium Level mmol/L 2.5*  --  3.7  --  3.4* 3.6   Chloride mmol/L 106  --  108*  --  107 109*   Carbon Dioxide mmol/L 22  --  19*  --  22 22   Glucose Level mg/dL 127* 217* 217*  --  195* 153*   Glucose, UA mg/dL  --   --   --  2+*  --   --    Blood Urea Nitrogen mg/dL 21.7*  --  22.5*  --  22.6* 22.3*   Creatinine mg/dL 0.89  --  0.74  --  0.68 0.66   Albumin Level gm/dL 2.8*  --   --   --  3.2*  --    Bilirubin Total mg/dL 0.8  --   --   --  0.8  --    Alkaline Phosphatase unit/L 99  --   --   --  147  --    Aspartate Aminotransferase unit/L 225*  --   --   --  460*  --    Alanine Aminotransferase  unit/L 152*  --   --   --  259*  --    Magnesium Level mg/dL 2.50  --  1.80  --  1.70 1.50*   Phosphorus Level mg/dL  --   --  4.7  --  2.2* 2.0*       Drug levels (last 3 results):  No results for input(s): VANCOMYCINRA, VANCORANDOM, VANCOMYCINPE, VANCOPEAK, VANCOMYCINTR, VANCOTROUGH in the last 72 hours.    Microbiologic Results:  Microbiology Results (last 7 days)       Procedure Component Value Units Date/Time    Urine culture [[id number]] Collected: 12/07/22 0624    Order Status: Sent Specimen: Urine, Catheterized Updated: 12/07/22 0635    Blood Culture [[id number]] Collected: 12/07/22 0236    Order Status: Resulted Specimen: Blood from Hand, Left Updated: 12/07/22 0238    Blood Culture [[id number]] Collected: 12/07/22 0236    Order Status: Resulted Specimen: Blood from Arm, Right Updated: 12/07/22 0238

## 2022-12-07 NOTE — PROGRESS NOTES
Inpatient Nutrition Assessment    Admit Date: 12/7/2022   Total duration of encounter: 1 day     Nutrition Recommendation/Prescription     Pt NPO/ S/P cardiac arrest. Currently no propofol. When stable --suggest use enteral feeds--Peptamen AF @ 20ml/hr; increase as tolerated to goal rate 45ml/hr (23 hr cycle) to provide 1242 calories, 79 gm protein, 840 ml free water.   When extubated--ADAT to regular /boost tid  MVI/fe  Biweekly wt  Will monitor nutrition status     Communication of Recommendations: reviewed with nurse    Nutrition Assessment     Malnutrition Assessment/Nutrition-Focused Physical Exam    Malnutrition in the context of acute illness or injury  Degree of Malnutrition: unable to complete  Energy Intake: unable to obtain  Interpretation of Weight Loss: unable to obtain  Body Fat:unable to obtain  Area of Body Fat Loss: unable to obtain  Muscle Mass Loss: unable to obtain  Area of Muscle Mass Loss: unable to obtain  Fluid Accumulation: unable to obtain  Edema: unable to obtain   Reduced  Strength: unable to obtain  A minimum of two characteristics is recommended for diagnosis of either severe or non-severe malnutrition.    Chart Review    Reason Seen: continuous nutrition monitoring and malnutrition screening tool    Diagnosis:  Cardiac Arrest, Anoxic Brain Injury, lactic acidosis, prolonged QT/ECG; aspiration PNA 2 vomit     Relevant Medical History: drug abuse, anxiety, bipolar depression     Nutrition-Related Medications: IVF LR @ 100ml/hr, levophed, famotidine, zosyn, Kcl, vancomycin   Calorie Containing IV Medications: no significant kcals from medications at this time    Nutrition-Related Labs:  (12-7) Gluc 217(H) Bun 22 Cr 0.7 K 3.7     Diet/PN Order: Diet NPO  Oral Supplement Order: none  Tube Feeding Order: none  Appetite/Oral Intake: NPO/NPO  Factors Affecting Nutritional Intake: impaired cognitive status/motor control, NPO, and on mechanical ventilation  Food/Tenriism/Cultural  "Preferences: unable to obtain  Food Allergies: unable to obtain       Wound(s):   none documented     Comments    (12/7) Received consult for MST trigger. Pt intubated; S/P code; currently having EEG done at bedside; suspected anoxic brain injury. No diet/or recent wt hx available. Unable to complete PA at this time 2 to EEG being completed. Pt has NGT; TF recs provided.     Anthropometrics    Height: 5' 2" (157.5 cm)    Last Weight: 50.1 kg (110 lb 7.2 oz) (12/07/22 0637) Weight Method: Bed Scale  BMI (Calculated): 20.2  BMI Classification: normal (BMI 18.5-24.9)     Ideal Body Weight (IBW), Female: 110 lb     % Ideal Body Weight, Female (lb): 100.41 %                    Usual Body Weight (UBW), kg:  (pt on vent; unable to obtain)        Usual Weight Provided By: EMR weight history    Wt Readings from Last 5 Encounters:   12/07/22 50.1 kg (110 lb 7.2 oz)   12/30/21 55.9 kg (123 lb 3.8 oz)   03/16/20 60.7 kg (133 lb 14.9 oz)   07/06/13 45.4 kg (100 lb)     Weight Change(s) Since Admission:  Admit Weight: 52.2 kg (115 lb) (12/07/22 0219)  (12/7) no recent wt hx; approx 10% wt loss over past year as per EMR.    Estimated Needs    Weight Used For Calorie Calculations: 50.1 kg (110 lb 7.2 oz)  Energy Calorie Requirements (kcal): 1252 kcal/d; 25 xenia/kg on vent  Energy Need Method: Kcal/kg  Weight Used For Protein Calculations: 50.1 kg (110 lb 7.2 oz)  Protein Requirements: 75 gm protein/d; 1.5 gm/kg  Fluid Requirements (mL): 1252 ml/d; 1ml/xenia  Temp: (!) 93 °F (33.9 °C)       Enteral Nutrition    Patient not receiving enteral nutrition at this time.    Parenteral Nutrition    Patient not receiving parenteral nutrition support at this time.    Evaluation of Received Nutrient Intake    Calories: not meeting estimated needs  Protein: not meeting estimated needs    Patient Education    Not applicable.    Nutrition Diagnosis     PES: Inadequate oral intake related to cardiac arrest/vent as evidenced by NPO. " (new)    Interventions/Goals     Intervention(s): general/healthful diet, modified composition of enteral nutrition, modified rate of enteral nutrition, multivitamin/mineral supplement therapy, and collaboration with other providers  Goal: Meet greater than 75% of nutritional needs by follow-up. (new)    Monitoring & Evaluation     Dietitian will monitor food and beverage intake, enteral nutrition intake, and weight.  Nutrition Risk/Follow-Up: moderate (follow-up in 3-5 days)   Please consult if re-assessment needed sooner.

## 2022-12-07 NOTE — ED PROVIDER NOTES
Encounter Date: 12/7/2022       History     Chief Complaint   Patient presents with    Cardiac Arrest     42-year-old female presents emergency room brought in in cardiac arrest.  [place], per the individual who brought her to the emergency room.  He reports that he is only acquaintance, and currently living in his van at the campground.  He felt bad for this lady for sleeping in a tent, and was allowing her to sleep within his van.  He reports going to take a shower at the local facilities, and when returned, found the patient to be unresponsive.  Reports patient has a history of drug abuse.  He reports she was not breathing, and began administering CPR.  He then drove her here to the emergency room intermittently providing CPR; reports the campground about 20-30 minutes away (20-30 minutes that the patient has been down).  Patient arrived in cardiac arrest without a pulse with vomit on her shirt.    Upon presentation, CPR was begun, IV access difficult to initially obtain so an IO was placed in the right humerus to provide medications.  On rhythm check, patient was in PE a until ultimately ROSC was achieved.  Prior to receiving ROSC, patient was intubated by myself.  Patient had significant vomit content within her mouth that was thick, requiring significant suctioning and manual removal in order to get down to the level of the epiglottis.  As much material as possible was removed prior to intubation.  ROSC was achieved after few more rounds following intubation.    Contact information for gentle mentioned above who brought the patient to the ER for any additional questions:  Sushil Bill  [phone]      Review of patient's allergies indicates:   Allergen Reactions    Olanzapine Swelling    Tizanidine Itching     Past Medical History:   Diagnosis Date    Anxiety     Bipolar 1 disorder     Depression      History reviewed. No pertinent surgical history.  History  reviewed. No pertinent family history.  Social History     Tobacco Use    Smoking status: Every Day     Packs/day: 1.00     Types: Cigarettes   Substance Use Topics    Alcohol use: No    Drug use: Yes     Review of Systems   Unable to perform ROS: Acuity of condition     Physical Exam     Initial Vitals   BP Pulse Resp Temp SpO2   12/07/22 0216 12/07/22 0216 12/07/22 0226 -- 12/07/22 0206   (!) 61/28 103 18  100 %      MAP       --                Physical Exam    Nursing note and vitals reviewed.  HENT:   Head: Normocephalic.   Ecchymosis of right lower eyelid - appears old; no soft tissue swelling appreciated   Eyes:   Pupils dilated and fixed   Cardiovascular:            asystole   Pulmonary/Chest: She has no wheezes. She has no rales.   Equal breath sounds bilaterally when receiving breaths.   Abdominal: Abdomen is soft. Bowel sounds are normal. She exhibits no distension.     Neurological: She is unresponsive. GCS eye subscore is 1. GCS motor subscore is 1.       ED Course   Intubation    Date/Time: 12/7/2022 2:06 AM  Location procedure was performed: [facility] EMERGENCY DEPARTMENT  Performed by: Jassi Hanson MD  Authorized by: Jassi Hanson MD   Pre-operative diagnosis: Cardiac and respiratory arrest  Post-operative diagnosis: Cardiac and respiratory arrest  Consent Done: Emergent Situation  Indications: respiratory distress, hypoxemia, respiratory failure and airway protection  Intubation method: direct  Patient status: unconscious  Preoxygenation: bag valve mask  Paralytic: none  Laryngoscope size: Coombs 3  Tube size: 7.5 mm  Tube type: cuffed  Number of attempts: 2 (Initially, unable to visualize due to vomitis and area suctioned vomitus during first attempt.)  Ventilation between attempts: BVM  Cords visualized: yes  Post-procedure assessment: chest rise, ETCO2 monitor and CO2 detector  Breath sounds: equal and absent over the epigastrium  Cuff inflated: yes  ETT to lip: 23 cm  Tube secured  with: ETT espinal  Chest x-ray interpreted by me.  Chest x-ray findings: endotracheal tube in appropriate position  Patient tolerance: Patient tolerated the procedure well with no immediate complications  Complications: No      ET Tube Exchage - 7.5 to 8.5    Date/Time: 12/7/2022 5:15 AM  Location procedure was performed: [facility] EMERGENCY DEPARTMENT  Performed by: Jassi Hanson MD  Authorized by: Jassi Hanson MD   Pre-operative diagnosis: Difficulty with suctioning due to thick vomitus  Post-operative diagnosis: Difficulty with suctioning due to thick vomitus s/p larger tube placed.  Indications: pulmonary toilet  Intubation method: video-assisted  Patient status: paralyzed (RSI)  Sedatives: ketmine  Paralytic: succinylcholine  Laryngoscope size: Glide 3  Tube size: 8.5 mm  Tube type: cuffed  Number of attempts: 1  Cords visualized: yes  Post-procedure assessment: chest rise, ETCO2 monitor and CO2 detector  Breath sounds: equal and absent over the epigastrium and rales/crackles  Cuff inflated: yes  ETT to lip: 24 cm  Tube secured with: ETT espinal  Chest x-ray interpreted by me.  Chest x-ray findings: endotracheal tube in appropriate position  Patient tolerance: Patient tolerated the procedure well with no immediate complications  Complications: No      Critical Care    Date/Time: 12/7/2022 5:58 AM  Performed by: Jassi Hanson MD  Authorized by: Jassi Hanson MD   Direct patient critical care time: 50 minutes  Additional history critical care time: 10 minutes  Ordering / reviewing critical care time: 15 minutes  Documentation critical care time: 30 minutes  Consulting other physicians critical care time: 10 minutes  Total critical care time (exclusive of procedural time) : 115 minutes  Critical care time was exclusive of separately billable procedures and treating other patients and teaching time.  Critical care was necessary to treat or prevent imminent or life-threatening deterioration  of the following conditions: CNS failure or compromise, circulatory failure, metabolic crisis and respiratory failure.  Critical care was time spent personally by me on the following activities: interpretation of cardiac output measurements, evaluation of patient's response to treatment, examination of patient, ordering and performing treatments and interventions, ordering and review of laboratory studies, ordering and review of radiographic studies, pulse oximetry, re-evaluation of patient's condition and vascular access procedures.      Labs Reviewed   COMPREHENSIVE METABOLIC PANEL - Abnormal; Notable for the following components:       Result Value    Sodium Level 146 (*)     Potassium Level 2.5 (*)     Glucose Level 127 (*)     Blood Urea Nitrogen 21.7 (*)     Calcium Level Total 7.6 (*)     Protein Total 4.7 (*)     Albumin Level 2.8 (*)     Globulin 1.9 (*)     Alanine Aminotransferase 152 (*)     Aspartate Aminotransferase 225 (*)     All other components within normal limits   CK - Abnormal; Notable for the following components:    Creatine Kinase 2,171 (*)     All other components within normal limits   CK-MB - Abnormal; Notable for the following components:    Creatine Kinase MB 13.0 (*)     All other components within normal limits   LACTIC ACID, PLASMA - Abnormal; Notable for the following components:    Lactic Acid Level 8.1 (*)     All other components within normal limits   CBC WITH DIFFERENTIAL - Abnormal; Notable for the following components:    RBC 2.88 (*)     Hgb 8.8 (*)     Hct 26.7 (*)     MPV 11.5 (*)     IG# 0.06 (*)     All other components within normal limits   MANUAL DIFFERENTIAL - Abnormal; Notable for the following components:    Neut Man 46 (*)     Lymph Man 50 (*)     Abs Lymp 4.9 (*)     RBC Morph Abnormal (*)     Anisocyte 1+ (*)     All other components within normal limits   GLUCOSE, RANDOM - Abnormal; Notable for the following components:    Glucose Level 217 (*)     All other  components within normal limits   POCT GLUCOSE - Abnormal; Notable for the following components:    POCT Glucose 120 (*)     All other components within normal limits   TROPONIN I - Normal   LACTIC ACID, PLASMA - Normal   MAGNESIUM - Normal   BLOOD CULTURE OLG   BLOOD CULTURE OLG   CULTURE, URINE   CBC W/ AUTO DIFFERENTIAL    Narrative:     The following orders were created for panel order CBC Auto Differential.  Procedure                               Abnormality         Status                     ---------                               -----------         ------                     CBC with Differential[[id number]]        Abnormal            Final result               Manual Differential[[id number]]          Abnormal            Final result                 Please view results for these tests on the individual orders.   DRUG SCREEN, URINE (BEAKER)   EXTRA TUBES    Narrative:     The following orders were created for panel order EXTRA TUBES.  Procedure                               Abnormality         Status                     ---------                               -----------         ------                     Light Blue Top Hold[[id number]]                                                           Please view results for these tests on the individual orders.   LIGHT BLUE TOP HOLD   PHOSPHORUS   TROPONIN I   CK   URINALYSIS   NEURON SPECIFIC ENOLASE, SERUM   BASIC METABOLIC PANEL   MAGNESIUM     EKG Readings: (Independently Interpreted)   12/07/2022 4:58 AM  Rate: 111 bpm  Rhythm: Sinus  Axis: Normal  Intervals: Prolonged QTc - 522ms  ST Changes: Nonspecific  Impression: Normal sinus rhythm with prolonged QT.         Imaging Results              X-Ray Chest 1 View (In process)                      CT Head Without Contrast (Preliminary result)  Result time 12/07/22 04:25:27      Preliminary result by Octavio Driscoll MD (12/07/22 04:25:27)                   Narrative:    START OF REPORT:  Technique: CT of the head was  performed without intravenous contrast with axial as well as coronal and sagittal images.    Comparison: None.    Dosage Information: Automated Exposure Control was utilized 1026.32 mGy.cm.    Clinical history: Altered mental status.    Findings:  Hemorrhage: No acute intracranial hemorrhage is seen.  CSF spaces: The ventricles sulci and basal cisterns are within normal limits.  Brain parenchyma: There is loss of gray-white matter differentiation with effacement of the sylvian fissures and perimesencephalic cisterns. There is also loss of gray-white matter differentiation in the temporal lobes and cerebellum.  Herniation: The cerebellar tonsils appear to be low lying. This could reflect an element of mass effect from edematous brain parenchyma.  Intracranial calcifications: Incidental note is made of bilateral choroid plexus calcification. Incidental note is made of some pineal region calcification.  Calvarium: No acute linear or depressed skull fracture is seen.    Maxillofacial Structures:  Paranasal sinuses: There are secretions in the nasal cavities and nasopharynx.  Orbits: The orbits appear unremarkable.  Zygomatic arches: The zygomatic arches are intact and unremarkable.  Temporal bones and mastoids: The temporal bones and mastoids appear unremarkable.  TMJ: The mandibular condyles appear normally placed with respect to the mandibular fossa.    Miscellaneous: Partly imaged nasogastric catheter and endotracheal tube are seen.      Impression:  1. No acute intracranial hemorrhage is seen.  2. There is loss of gray-white matter differentiation with effacement of the sylvian fissures and perimesencephalic cisterns. There is also loss of gray-white matter differentiation in the temporal lobes and cerebellum. These findings are of concern for significant anoxic brain injury. Correlate clinically and recommend continue the interval follow up as indicated.  3. Details and other findings as noted above.                           Preliminary result by Interface, Rad Results In (12/07/22 04:25:27)                   Narrative:    START OF REPORT:  Technique: CT of the head was performed without intravenous contrast with axial as well as coronal and sagittal images.    Comparison: None.    Dosage Information: Automated Exposure Control was utilized 1026.32 mGy.cm.    Clinical history: Altered mental status.    Findings:  Hemorrhage: No acute intracranial hemorrhage is seen.  CSF spaces: The ventricles sulci and basal cisterns are within normal limits.  Brain parenchyma: There is loss of gray-white matter differentiation with effacement of the sylvian fissures and perimesencephalic cisterns. There is also loss of gray-white matter differentiation in the temporal lobes and cerebellum.  Herniation: The cerebellar tonsils appear to be low lying. This could reflect an element of mass effect from edematous brain parenchyma.  Intracranial calcifications: Incidental note is made of bilateral choroid plexus calcification. Incidental note is made of some pineal region calcification.  Calvarium: No acute linear or depressed skull fracture is seen.    Maxillofacial Structures:  Paranasal sinuses: There are secretions in the nasal cavities and nasopharynx.  Orbits: The orbits appear unremarkable.  Zygomatic arches: The zygomatic arches are intact and unremarkable.  Temporal bones and mastoids: The temporal bones and mastoids appear unremarkable.  TMJ: The mandibular condyles appear normally placed with respect to the mandibular fossa.    Miscellaneous: Partly imaged nasogastric catheter and endotracheal tube are seen.      Impression:  1. No acute intracranial hemorrhage is seen.  2. There is loss of gray-white matter differentiation with effacement of the sylvian fissures and perimesencephalic cisterns. There is also loss of gray-white matter differentiation in the temporal lobes and cerebellum. These findings are of concern for significant  anoxic brain injury. Correlate clinically and recommend continue the interval follow up as indicated.  3. Details and other findings as noted above.                                         X-Ray Chest 1 View (Preliminary result)  Result time 12/07/22 04:54:27      ED Interpretation by Jassi Hanson MD (12/07/22 04:54:27, [facility] Emergency Dept, Emergency Medicine)    No acute abnormality appreciated; ET tube in above asmita.                                     Medications   NORepinephrine 4 mg in dextrose 5 % 250 mL infusion (0.02 mcg/kg/min × 52.2 kg Intravenous Restarted 12/7/22 0442)   potassium chloride 10 mEq in 100 mL IVPB (10 mEq Intravenous New Bag 12/7/22 9888)   sodium chloride 0.9% flush 10 mL (has no administration in time range)   enoxaparin injection 40 mg (has no administration in time range)   famotidine (PF) injection 20 mg (has no administration in time range)   levETIRAcetam in NaCl (iso-os) IVPB 1,000 mg (has no administration in time range)   magnesium sulfate 2g in water 50mL IVPB (premix) (has no administration in time range)   sodium bicarbonate 150 mEq in dextrose 5 % 1,150 mL infusion (has no administration in time range)   potassium chloride 10 mEq in 100 mL IVPB (has no administration in time range)   piperacillin-tazobactam (ZOSYN) 4.5 g in sodium chloride 0.9 % 100 mL IVPB (MB+) (has no administration in time range)   propofol (DIPRIVAN) 10 mg/mL infusion (has no administration in time range)   vancomycin (VANCOCIN) 1,250 mg in sodium chloride 0.9% 250 mL IVPB (has no administration in time range)   mupirocin 2 % ointment (has no administration in time range)   sodium chloride 0.9% bolus 2,000 mL (0 mLs Intravenous Stopped 12/7/22 0407)   levETIRAcetam in NaCl (iso-os) IVPB 1,000 mg (1,000 mg Intravenous New Bag 12/7/22 8099)   ketamine in 0.9 % sod chloride 50 mg/5 mL (10 mg/mL) injection 100 mg (100 mg Intravenous Given by Provider 12/7/22 4723)   succinylcholine  injection 80 mg (80 mg Intravenous Given by Provider 12/7/22 0530)                 ED Course as of 12/07/22 0600   Wed Dec 07, 2022   0259 BP improved - currently on levophed, but will begin titrating down.  Has received 2 liter of normal saline IV, currently on normal saline 125mg IV.  Noted to be acidotic on ABG.  Will reassess now that patient is more stable, and awaiting on laboratory evaluation and repeat ABG, then consider bicarb drip.  Patient received 2 amps of bicarbonate during cardiac arrest. [MW]   0352 Will repeat ABG to determine pH.  Patient has been titrated off of Levophed, currently maintaining blood pressure without pressor support.  Internal Medicine has been consulted for admission. [MW]   0415 Patient has episodes of short bursts of tremors, mainly when evaluating people response with bright light.  When this occurred, patient opens her eyes very very wide, and body becomes rigid.  This lasts about 2-3 seconds, then patient goes back to sedated state.  Patient has no response to sternal rub (no posturing).  No localizing to pain.  The only stimulation we can ascertain from the patient is when shining light into her eyes.  Will begin patient on Keppra for seizure prophylaxis.  Awaiting repeat ABG result [MW]   0424 Patient currently unresponsive though due to these intermittent movements, will place an order for medical restraints to prevent the patient from self extubating if she does awaken. [MW]   0426 No significant improvement on ABG.  PCO2 worsening.  Patient is still having large chunks with an ETT.  Will exchange to a larger ET tube to allow better pulmonary toileting. [MW]   0520 ETT exchange performed over bougie, patient tolerated well.  Able to place in 8 half ET tube without difficulty.  Repeat chest x-ray ordered. [MW]      ED Course User Index  [MW] Jassi Hanson MD                 Clinical Impression:   Final diagnoses:  [I46.9] Cardiac arrest  [G93.1] Anoxic brain injury  (Primary)  [E87.20] Lactic acidosis  [R94.31] Prolonged Q-T interval on ECG  [J69.0] Aspiration pneumonia of both lungs due to vomit, unspecified part of lung        ED Disposition Condition    Admit Stable                Jassi Hanson MD  12/07/22 0600

## 2022-12-07 NOTE — PROGRESS NOTES
Pt currently on the vent. TONO attempted to call pts EC listed on the Fs, Kiet Cannon ([phone]), but his phone is not taking calls at this time. He also does not have a voice mail set up. No other EC listed. TONO will continue to try to call him.   TONO also spoke with pts RN who states that no one has called or come to the hospital to check on the pt. CM to follow up at a later time.

## 2022-12-07 NOTE — PROGRESS NOTES
Cooling blanket turned off, pt was in process of being cooled to goal temp of 33, new order from physician stating goal of 36 degrees, pt currently 33.5 degrees

## 2022-12-07 NOTE — PROCEDURES
ROUTINE EEG    Indication: Cardiac Arrest     Clinical Information: 43 yo F with past medical history of anxiety/depression and Bipolar I disorder per chart review who was brought in by neighbor in cardiac arrest.     Anticonvulsants:  Levetiracetam 1000 mg twice a day, propofol    Recording Condition: This is a Routine electroencephalogram performed in ICU settings using [employer] software. Electroencephalogram leads were placed using a 10-20 placement system. The electroencephalogram is monitored in real time by a technologist and is of good technical quality for review.     Technique: Electroencephalogram leads were placed using a 10-20 placement system and electrode impedance was maintained below 10KOhms. Jj and seizure detection computer program was used for digital EEG analysis throughout the monitoring period, to screen the EEG in real time and jose the data file with pointers to electrographic seizure and interictal discharges. Hyperventilation was not performed and Photic stimulation was not performed.     Description:   Background Symmetry- grossly symmetric with machinery artifact that were not able to be filtered through 60 hertz or 70 hertz notch filter.  Frequency - high-frequency background, likely due to signal artifact from surroundings.  Voltage - High (>150 microVolt)   Continuity - Continuous  Anterior to Posterior Gradient - Absent  Posterior Dominant Rhythm - absent  Reactivity - Unreactive  State Changes - Absent  Breach Artifact - Absent    Cyclic Alternating Pattern of Encephalopathy (CAPE) - Absent  Sporadic Epileptiform Discharges - Absent  Rhythmic or Periodic Patterns (RPPs) - Present:  Frequent 2-3 hertz GPDs, lasting 1-2 seconds, without evolution.    Electroclinical Seizures (ECSz): Absent  Electroclinical Status Epilepticus (ECSE): Absent  Electrographical Seizures (Esz): Absent    Briefly Potentially Ictal Rhythmic Discharges (BIRDs): Absent  Ictal-Interictal Continuum (IIC):  Absent    Conclusion: This is an abnormal Routine comatosed EEG limited by machine artifacts with findings showing frequent > 2.5 Hz generalized periodic discharges.    Impression: This is an abnormal limited Routine comatosed EEG consistent with anoxic brain injury, with tracing background limited by machine artifacts.  Patient does not meet criteria for burst suppression.  Generalized periodic discharge pattern in the setting of anoxic brain injury is considered as Malignant EEG Pattern (MEP).  A combination of clinical post anoxic myoclonus and MEP is predictive of high mortality rate and poor outcome.  However, MEP alone in the absence of clinical post anoxic myoclonus does not necessarily predict high mortality rate nor poor outcome.  Clinical correlation is advised.

## 2022-12-07 NOTE — H&P
Critical Care Medicine History and Physical Note  [facility] - Emergency Dept      Patient Name: Criselda Cannon  MRN: [id number]  Admission Date: 12/7/2022  Hospital Length of Stay: 0 days  Code Status: Full Code  Attending Provider: Eliud Peters MD  Primary Care Provider: Primary Doctor No   Principal Problem: Cardiac arrest    HPI:  43 yo F with past medical history of anxiety/depression and Bipolar I disorder per chart review who was brought in by neighbor in cardiac arrest. History obtained from ED provider who spoke with individual prior to his departure from ED. [facility] and [facility] has been allowing patient to stay in his van when the weather is bad. The individual left for approximately 20 minutes and upon his return the patient was found unresponsive and not breathing. He began CPR which was performed intermittently until their arrival to ED. Drive from [facility] to ED took approximately 20-30 minutes. Patient with history of drug abuse.     Patient found to be in PEA upon arrival to ED. ACLS initiated, IO access obtained, patient intubated and ROSC ultimately achieved. Patient received 1mg Epi x 3 and 2 amps bicarb during ACLS. See separate note for detail. Patient with significant amount of thick vomitus in mouth prior to intubation. Internal medicine service consulted for admission.       Past Medical History:   Diagnosis Date    Anxiety     Bipolar 1 disorder     Depression      No past surgical history on file.      Social History     Socioeconomic History    Marital status: Single   Tobacco Use    Smoking status: Every Day     Packs/day: 1.00     Types: Cigarettes   Substance and Sexual Activity    Alcohol use: No    Drug use: Yes    Sexual activity: Yes     No family history on file.    Drug Allergies:   Review of patient's allergies indicates:   Allergen Reactions    Olanzapine Swelling    Tizanidine Itching       Review of Systems    Unable to perform ROS: Patient unresponsive       Vital Signs:    HR 99  SpO2 100%  Resp 17  /73 (MAP 90)    Physical Exam  GEN: mechanically ventilated, unresponsive to verbal or tactile stimuli without sedation, ill appearing, in distress   HEENT: normocephalic, ecchymosis inferior to right eye, appears old and without acute swelling, pupils 3mm, pupils non reactive to light, no corneal reflex, NG tube in place at 54 cm, pink and moist mucous membranes  Neck: no JVD  CARDIO: regular rate, regular rhythm, normal S1, S2, no murmurs, rubs, clicks or gallops   PULM/CHEST: ET tube in place, mechanically ventilated, diffuse rhonchi, symmetric air entry  ABDOMEN: + BS, soft, non-distended, no masses or organomegaly   EXT: 2+ dorsal pedal and radial pulses peripheral pulses normal, no clubbing or cyanosis, calves equal in size, no BLE edema   NEURO: unresponsive to verbal or tactile stimuli, no withdrawal to pain, myoclonic jerking induced by shining light in eyes, intermittent facial muscle spasms, no purposeful movements appreciated, absent corneal, cough or jag reflex     Ventilator  Vent Mode: A/C (12/07/22 0437)  Ventilator Initiated: Yes (12/07/22 0206)  Set Rate: 18 BPM (12/07/22 0437)  Vt Set: 400 mL (12/07/22 0437)  PEEP/CPAP: 5 cmH20 (12/07/22 0437)  Oxygen Concentration (%): 100 (12/07/22 0437)  Peak Airway Pressure: 30 cmH20 (12/07/22 0437)  Total Ve: 11.2 L/m (12/07/22 0437)    Laboratory Studies:     Recent Labs   Lab 12/07/22 0438   PH 7.06*   PCO2 104*   PO2 101*   HCO3 29.4*   POCSATURATED 94.6       Recent Labs   Lab 12/07/22 0235   WBC 9.8   RBC 2.88*   HGB 8.8*   HCT 26.7*      MCV 92.7   MCH 30.6   MCHC 33.0       Recent Labs   Lab 12/07/22 0235 12/07/22 0445   GLUCOSE 127* 217*   *  --    K 2.5*  --    CO2 22  --    BUN 21.7*  --    CREATININE 0.89  --    MG 2.50  --          Microbiology Data:   Microbiology Results (last 7 days)       Procedure Component Value Units  Date/Time    Urine culture [[id number]]     Order Status: Sent Specimen: Urine     Blood Culture [[id number]] Collected: 12/07/22 0236    Order Status: Sent Specimen: Blood from Hand, Left Updated: 12/07/22 0238    Blood Culture [[id number]] Collected: 12/07/22 0236    Order Status: Sent Specimen: Blood from Arm, Right Updated: 12/07/22 0238          EKG: sinus tachycardia, prolonged Qtc    Imaging reviewed:  CT Head Without Contrast  START OF REPORT:  Technique: CT of the head was performed without intravenous contrast with axial as well as coronal and sagittal images.    Comparison: None.    Dosage Information: Automated Exposure Control was utilized 1026.32 mGy.cm.    Clinical history: Altered mental status.    Findings:  Hemorrhage: No acute intracranial hemorrhage is seen.  CSF spaces: The ventricles sulci and basal cisterns are within normal limits.  Brain parenchyma: There is loss of gray-white matter differentiation with effacement of the sylvian fissures and perimesencephalic cisterns. There is also loss of gray-white matter differentiation in the temporal lobes and cerebellum.  Herniation: The cerebellar tonsils appear to be low lying. This could reflect an element of mass effect from edematous brain parenchyma.  Intracranial calcifications: Incidental note is made of bilateral choroid plexus calcification. Incidental note is made of some pineal region calcification.  Calvarium: No acute linear or depressed skull fracture is seen.    Maxillofacial Structures:  Paranasal sinuses: There are secretions in the nasal cavities and nasopharynx.  Orbits: The orbits appear unremarkable.  Zygomatic arches: The zygomatic arches are intact and unremarkable.  Temporal bones and mastoids: The temporal bones and mastoids appear unremarkable.  TMJ: The mandibular condyles appear normally placed with respect to the mandibular fossa.    Miscellaneous: Partly imaged nasogastric catheter and endotracheal tube are  seen.    Impression:  1. No acute intracranial hemorrhage is seen.  2. There is loss of gray-white matter differentiation with effacement of the sylvian fissures and perimesencephalic cisterns. There is also loss of gray-white matter differentiation in the temporal lobes and cerebellum. These findings are of concern for significant anoxic brain injury. Correlate clinically and recommend continue the interval follow up as indicated.  3. Details and other findings as noted above.      Assessment and Plan:    Assessment:    Out of hospital cardiac arrest with ROSC achieved- PEA on arrival, suspect 2/2 drug overdose  Acute hypoxemic hypercapnic respiratory failure   Anoxic brain injury   Aspiration  Respiratory acidosis   Lactic acidosis- resolved  Hypokalemia    Plan:  - Admit to ICU  - Patient requiring mechanical ventilation; ARDSnet protocol, adjust wean vent settings as tolerated  - Currently not requiring vasopressor support; Levophed prn to keep MAPs >65  - Propofol gtt prn   - will initiate targeted temperature management protocol  - UDS, CTA Chest, ECHO pending   - Vanc/Zosyn  - replete electrolytes prn   - sodium bicarb gtt @ 75 mL/hr, plan to switch to LR after improvement of acidosis  - Keppra 1000 mg BID   - attempted to call contact number in chart, number not accepting calls      DVT ppx/tx with lovenox  GI ppx with famotidine   Keep HOB elevated > 30*    The patient remains at high risk of decompensation and death and will remain in ICU level care.     > 45 min of critical care time was spent reviewing the patient's chart including medications, radiographs, labs, pertinent cultures and pathology data, other consultant notes/recomendations as well as titration of vasopressors, adjustment of mechanical ventilatory or NIPPV support, as well as discussion of goals of care with nursing staff, respiratory therapy at the bedside and with family at the bedside/via phone.      Diane Thomason MD  12/7/2022  [facility]  Resident, [facility]

## 2022-12-07 NOTE — ED NOTES
0154 - Compressions ongoing, IO access gained; 45mm 15g in R Humeral Head  0155 - 2 mg narcan IV via IO; 18g IV access gained R hand; Compressions halted for pulse check/Intubation; PEA  0156 - 1 mg Epi given via 18g R hand; Compressions resumed  0159 - 1 mg Epi given via 18g R hand;  0201 - Compressions halted for pulse check/intubation attempt; PEA -> compressions resumed  0203 - 1 mg Epi given via 18g R hand;  0204 - Compressions halted for pulse check/intubation attempt; Airway obtained 7.5 ET tube 23 at lip, Color change noted on CO2 detector ; PEA -> resume compressions  0206 - NG tube placed via R nare at 54;  0208 - Compressions halted for pulse check; PEA -> compressions resumed; 1 mg Epi given via 18g R hand;  0209 - 1 amp bicarb given via IO R shoulder  0211 - Compressions stopped for pulse check; PEA -> compressions resumed; 1 L NS bolus started via IO R shoulder  0213 - 1 amp bicarb given via R hand 18g  0214 - ROSC obtained at this time; P-108, BP-61/28, O2-100;

## 2022-12-07 NOTE — PROCEDURES
"Criselda Cannon is a 42 y.o. female patient.    Temp: 97 °F (36.1 °C) (12/07/22 1145)  Pulse: 100 (12/07/22 1100)  Resp: (!) 41 (12/07/22 1100)  BP: (!) 147/107 (12/07/22 1100)  SpO2: 100 % (12/07/22 1100)  Weight: 49.9 kg (110 lb) (12/07/22 0800)  Height: 5' 2" (157.5 cm) (12/07/22 0800)    PICC  Date/Time: 12/7/2022 11:55 AM  Performed by: Joan Block RN  Time out: Immediately prior to procedure a time out was called to verify the correct patient, procedure, equipment, support staff and site/side marked as required  Indications: vascular access  Anesthesia: local infiltration  Local anesthetic: lidocaine 1% without epinephrine  Anesthetic Total (mL): 5  Preparation: skin prepped with ChloraPrep  Skin prep agent dried: skin prep agent completely dried prior to procedure  Sterile barriers: all five maximum sterile barriers used - cap, mask, sterile gown, sterile gloves, and large sterile sheet  Hand hygiene: hand hygiene performed prior to central venous catheter insertion  Location details: left basilic  Catheter type: triple lumen  Catheter size: 5 Fr  Catheter Length: 38cm    Ultrasound guidance: yes  Vessel Caliber: medium and patent, compressibility normal  Needle advanced into vessel with real time Ultrasound guidance.  Guidewire confirmed in vessel.  Sterile sheath used.  Number of attempts: 1  Post-procedure: blood return through all ports, sterile dressing applied and chlorhexidine patch    Assessment: placement verified by x-ray  Complications: none  Comments: Arm circumference 27cm        Name Joan Block RN  12/7/2022    "

## 2022-12-08 LAB
ALBUMIN SERPL-MCNC: 2.7 GM/DL (ref 3.5–5)
ALBUMIN/GLOB SERPL: 1.2 RATIO (ref 1.1–2)
ALP SERPL-CCNC: 102 UNIT/L (ref 40–150)
ALT SERPL-CCNC: 170 UNIT/L (ref 0–55)
ANION GAP SERPL CALC-SCNC: 9 MEQ/L
APTT PPP: 42.7 SECONDS
AST SERPL-CCNC: 186 UNIT/L (ref 5–34)
BASE EXCESS ARTERIAL: -2.5 MMOL/L (ref -2–2)
BASOPHILS # BLD AUTO: 0.03 X10(3)/MCL (ref 0–0.2)
BASOPHILS NFR BLD AUTO: 0.3 %
BILIRUBIN DIRECT+TOT PNL SERPL-MCNC: 1.3 MG/DL
BUN SERPL-MCNC: 16.4 MG/DL (ref 7–18.7)
BUN SERPL-MCNC: 17.4 MG/DL (ref 7–18.7)
CALCIUM SERPL-MCNC: 7.4 MG/DL (ref 8.4–10.2)
CALCIUM SERPL-MCNC: 7.5 MG/DL (ref 8.4–10.2)
CHLORIDE SERPL-SCNC: 111 MMOL/L (ref 98–107)
CHLORIDE SERPL-SCNC: 111 MMOL/L (ref 98–107)
CK SERPL-CCNC: 1754 U/L (ref 29–168)
CO2 SERPL-SCNC: 21 MMOL/L (ref 22–29)
CO2 SERPL-SCNC: 21 MMOL/L (ref 22–29)
CORRECTED TEMPERATURE (PCO2): 40 MMHG (ref 35–45)
CORRECTED TEMPERATURE (PH): 7.37 (ref 7.35–7.45)
CORRECTED TEMPERATURE (PO2): 296 MMHG (ref 75–100)
CREAT SERPL-MCNC: 0.6 MG/DL (ref 0.55–1.02)
CREAT SERPL-MCNC: 0.61 MG/DL (ref 0.55–1.02)
CREAT/UREA NIT SERPL: 27
EOSINOPHIL # BLD AUTO: 0 X10(3)/MCL (ref 0–0.9)
EOSINOPHIL NFR BLD AUTO: 0 %
ERYTHROCYTE [DISTWIDTH] IN BLOOD BY AUTOMATED COUNT: 12.5 % (ref 11.5–17)
GFR SERPLBLD CREATININE-BSD FMLA CKD-EPI: >60 MLS/MIN/1.73/M2
GFR SERPLBLD CREATININE-BSD FMLA CKD-EPI: >60 MLS/MIN/1.73/M2
GLOBULIN SER-MCNC: 2.3 GM/DL (ref 2.4–3.5)
GLUCOSE SERPL-MCNC: 69 MG/DL (ref 74–100)
GLUCOSE SERPL-MCNC: 77 MG/DL (ref 74–100)
GLUCOSE SERPL-MCNC: 78 MG/DL (ref 74–100)
GLUCOSE SERPL-MCNC: 85 MG/DL (ref 74–100)
HCO3 ARTERIAL: 23.2 MMOL/L (ref 18–23)
HCO3 UR-SCNC: 23.1 MMOL/L (ref 22–26)
HCT VFR BLD AUTO: 29.1 % (ref 37–47)
HGB BLD-MCNC: 11.1 G/DL
HGB BLD-MCNC: 9.9 G/DL (ref 12–18)
HGB BLD-MCNC: 9.9 GM/DL (ref 12–16)
IMM GRANULOCYTES # BLD AUTO: 0.04 X10(3)/MCL (ref 0–0.04)
IMM GRANULOCYTES NFR BLD AUTO: 0.4 %
LYMPHOCYTES # BLD AUTO: 1.21 X10(3)/MCL (ref 0.6–4.6)
LYMPHOCYTES NFR BLD AUTO: 11.1 %
MAGNESIUM SERPL-MCNC: 2.2 MG/DL (ref 1.6–2.6)
MAGNESIUM SERPL-MCNC: 2.3 MG/DL (ref 1.6–2.6)
MCH RBC QN AUTO: 29.6 PG (ref 27–31)
MCHC RBC AUTO-ENTMCNC: 34 MG/DL (ref 33–36)
MCV RBC AUTO: 86.9 FL (ref 80–94)
MONOCYTES # BLD AUTO: 0.25 X10(3)/MCL (ref 0.1–1.3)
MONOCYTES NFR BLD AUTO: 2.3 %
NEUTROPHILS # BLD AUTO: 9.4 X10(3)/MCL (ref 2.1–9.2)
NEUTROPHILS NFR BLD AUTO: 85.9 %
NRBC BLD AUTO-RTO: 0 %
PCO2 BLDA: 40 MMHG (ref 35–45)
PCO2 BLDA: 43 MM[HG]
PCO2 BLDA: ABNORMAL MM[HG]
PH SMN: 7.34 [PH]
PH SMN: 7.37 [PH] (ref 7.35–7.45)
PHOSPHATE SERPL-MCNC: 2.6 MG/DL (ref 2.3–4.7)
PHOSPHATE SERPL-MCNC: 3.5 MG/DL (ref 2.3–4.7)
PLATELET # BLD AUTO: 197 X10(3)/MCL (ref 130–400)
PMV BLD AUTO: 12.1 FL (ref 7.4–10.4)
PO2 BLDA: 296 MMHG (ref 75–100)
PO2 BLDA: 438 MM[HG]
POC BASE DEFICIT: -2 MMOL/L (ref -2–2)
POC COHB: 0.9
POC COHB: 1.3 % (ref 0.5–1.5)
POC FIO2: 100
POC IONIZED CALCIUM: ABNORMAL
POC METHB: 0.4
POC METHB: 1.5 % (ref 0–1.5)
POC O2HB: 96.9 % (ref 94–100)
POC O2HB: 98.4
POC PERFORMED BY: ABNORMAL
POC SATURATED O2: 99.9 % (ref 90–100)
POC TEMPERATURE: 37 °C
POCT GLUCOSE: 88 MG/DL (ref 70–110)
POTASSIUM BLD-SCNC: ABNORMAL MMOL/L
POTASSIUM SERPL-SCNC: 3.3 MMOL/L (ref 3.5–5.1)
POTASSIUM SERPL-SCNC: 3.7 MMOL/L (ref 3.5–5.1)
PROT SERPL-MCNC: 5 GM/DL (ref 6.4–8.3)
RBC # BLD AUTO: 3.35 X10(6)/MCL (ref 4.2–5.4)
SATURATED O2 ARTERIAL, I-STAT: 99.7
SODIUM BLD-SCNC: ABNORMAL MMOL/L
SODIUM SERPL-SCNC: 141 MMOL/L (ref 136–145)
SODIUM SERPL-SCNC: 142 MMOL/L (ref 136–145)
SPECIMEN SOURCE: ABNORMAL
VANCOMYCIN SERPL-MCNC: 7.4 UG/ML (ref 15–20)
WBC # SPEC AUTO: 10.9 X10(3)/MCL (ref 4.5–11.5)

## 2022-12-08 PROCEDURE — 99900026 HC AIRWAY MAINTENANCE (STAT)

## 2022-12-08 PROCEDURE — 94761 N-INVAS EAR/PLS OXIMETRY MLT: CPT

## 2022-12-08 PROCEDURE — 63600175 PHARM REV CODE 636 W HCPCS: Performed by: INTERNAL MEDICINE

## 2022-12-08 PROCEDURE — 85025 COMPLETE CBC W/AUTO DIFF WBC: CPT | Performed by: STUDENT IN AN ORGANIZED HEALTH CARE EDUCATION/TRAINING PROGRAM

## 2022-12-08 PROCEDURE — 27000221 HC OXYGEN, UP TO 24 HOURS

## 2022-12-08 PROCEDURE — 63600175 PHARM REV CODE 636 W HCPCS: Performed by: STUDENT IN AN ORGANIZED HEALTH CARE EDUCATION/TRAINING PROGRAM

## 2022-12-08 PROCEDURE — 25000003 PHARM REV CODE 250: Performed by: INTERNAL MEDICINE

## 2022-12-08 PROCEDURE — 99233 SBSQ HOSP IP/OBS HIGH 50: CPT | Mod: ,,, | Performed by: INTERNAL MEDICINE

## 2022-12-08 PROCEDURE — 99900035 HC TECH TIME PER 15 MIN (STAT)

## 2022-12-08 PROCEDURE — 25000003 PHARM REV CODE 250: Performed by: STUDENT IN AN ORGANIZED HEALTH CARE EDUCATION/TRAINING PROGRAM

## 2022-12-08 PROCEDURE — 36415 COLL VENOUS BLD VENIPUNCTURE: CPT | Performed by: STUDENT IN AN ORGANIZED HEALTH CARE EDUCATION/TRAINING PROGRAM

## 2022-12-08 PROCEDURE — 85610 PROTHROMBIN TIME: CPT | Performed by: STUDENT IN AN ORGANIZED HEALTH CARE EDUCATION/TRAINING PROGRAM

## 2022-12-08 PROCEDURE — 20000000 HC ICU ROOM

## 2022-12-08 PROCEDURE — 82947 ASSAY GLUCOSE BLOOD QUANT: CPT | Performed by: STUDENT IN AN ORGANIZED HEALTH CARE EDUCATION/TRAINING PROGRAM

## 2022-12-08 PROCEDURE — 80053 COMPREHEN METABOLIC PANEL: CPT | Performed by: STUDENT IN AN ORGANIZED HEALTH CARE EDUCATION/TRAINING PROGRAM

## 2022-12-08 PROCEDURE — 80202 ASSAY OF VANCOMYCIN: CPT | Performed by: INTERNAL MEDICINE

## 2022-12-08 PROCEDURE — A4216 STERILE WATER/SALINE, 10 ML: HCPCS | Performed by: STUDENT IN AN ORGANIZED HEALTH CARE EDUCATION/TRAINING PROGRAM

## 2022-12-08 PROCEDURE — 84100 ASSAY OF PHOSPHORUS: CPT | Performed by: STUDENT IN AN ORGANIZED HEALTH CARE EDUCATION/TRAINING PROGRAM

## 2022-12-08 PROCEDURE — 99233 PR SUBSEQUENT HOSPITAL CARE,LEVL III: ICD-10-PCS | Mod: ,,, | Performed by: INTERNAL MEDICINE

## 2022-12-08 PROCEDURE — 37799 UNLISTED PX VASCULAR SURGERY: CPT

## 2022-12-08 PROCEDURE — 94003 VENT MGMT INPAT SUBQ DAY: CPT

## 2022-12-08 PROCEDURE — 83735 ASSAY OF MAGNESIUM: CPT | Performed by: STUDENT IN AN ORGANIZED HEALTH CARE EDUCATION/TRAINING PROGRAM

## 2022-12-08 PROCEDURE — 82803 BLOOD GASES ANY COMBINATION: CPT

## 2022-12-08 PROCEDURE — 85730 THROMBOPLASTIN TIME PARTIAL: CPT | Performed by: STUDENT IN AN ORGANIZED HEALTH CARE EDUCATION/TRAINING PROGRAM

## 2022-12-08 RX ORDER — POTASSIUM CHLORIDE 7.45 MG/ML
10 INJECTION INTRAVENOUS
Status: COMPLETED | OUTPATIENT
Start: 2022-12-08 | End: 2022-12-08

## 2022-12-08 RX ORDER — DEXTROSE MONOHYDRATE 50 MG/ML
INJECTION, SOLUTION INTRAVENOUS CONTINUOUS
Status: DISCONTINUED | OUTPATIENT
Start: 2022-12-08 | End: 2022-12-12 | Stop reason: HOSPADM

## 2022-12-08 RX ORDER — LORAZEPAM 2 MG/ML
2 INJECTION INTRAMUSCULAR ONCE
Status: COMPLETED | OUTPATIENT
Start: 2022-12-08 | End: 2022-12-08

## 2022-12-08 RX ORDER — LORAZEPAM 2 MG/ML
INJECTION INTRAMUSCULAR
Status: DISPENSED
Start: 2022-12-08 | End: 2022-12-08

## 2022-12-08 RX ADMIN — SODIUM CHLORIDE, PRESERVATIVE FREE 10 ML: 5 INJECTION INTRAVENOUS at 11:12

## 2022-12-08 RX ADMIN — SODIUM CHLORIDE, PRESERVATIVE FREE 10 ML: 5 INJECTION INTRAVENOUS at 12:12

## 2022-12-08 RX ADMIN — MUPIROCIN: 20 OINTMENT TOPICAL at 08:12

## 2022-12-08 RX ADMIN — VANCOMYCIN HYDROCHLORIDE 750 MG: 750 INJECTION, POWDER, LYOPHILIZED, FOR SOLUTION INTRAVENOUS at 08:12

## 2022-12-08 RX ADMIN — LEVETIRACETAM INJECTION 1000 MG: 5 INJECTION INTRAVENOUS at 08:12

## 2022-12-08 RX ADMIN — POTASSIUM CHLORIDE 10 MEQ: 7.46 INJECTION, SOLUTION INTRAVENOUS at 05:12

## 2022-12-08 RX ADMIN — SODIUM CHLORIDE, PRESERVATIVE FREE 10 ML: 5 INJECTION INTRAVENOUS at 05:12

## 2022-12-08 RX ADMIN — PIPERACILLIN AND TAZOBACTAM 4.5 G: 4; .5 INJECTION, POWDER, LYOPHILIZED, FOR SOLUTION INTRAVENOUS; PARENTERAL at 01:12

## 2022-12-08 RX ADMIN — DEXTROSE MONOHYDRATE: 50 INJECTION, SOLUTION INTRAVENOUS at 04:12

## 2022-12-08 RX ADMIN — PROPOFOL 40 MCG/KG/MIN: 10 INJECTION, EMULSION INTRAVENOUS at 06:12

## 2022-12-08 RX ADMIN — SODIUM CHLORIDE, POTASSIUM CHLORIDE, SODIUM LACTATE AND CALCIUM CHLORIDE: 600; 310; 30; 20 INJECTION, SOLUTION INTRAVENOUS at 05:12

## 2022-12-08 RX ADMIN — PIPERACILLIN AND TAZOBACTAM 4.5 G: 4; .5 INJECTION, POWDER, LYOPHILIZED, FOR SOLUTION INTRAVENOUS; PARENTERAL at 05:12

## 2022-12-08 RX ADMIN — SODIUM CHLORIDE, POTASSIUM CHLORIDE, SODIUM LACTATE AND CALCIUM CHLORIDE: 600; 310; 30; 20 INJECTION, SOLUTION INTRAVENOUS at 04:12

## 2022-12-08 RX ADMIN — PROPOFOL 40 MCG/KG/MIN: 10 INJECTION, EMULSION INTRAVENOUS at 08:12

## 2022-12-08 RX ADMIN — PIPERACILLIN AND TAZOBACTAM 4.5 G: 4; .5 INJECTION, POWDER, LYOPHILIZED, FOR SOLUTION INTRAVENOUS; PARENTERAL at 10:12

## 2022-12-08 RX ADMIN — PROPOFOL 40 MCG/KG/MIN: 10 INJECTION, EMULSION INTRAVENOUS at 01:12

## 2022-12-08 RX ADMIN — FAMOTIDINE 20 MG: 10 INJECTION, SOLUTION INTRAVENOUS at 08:12

## 2022-12-08 RX ADMIN — ENOXAPARIN SODIUM 40 MG: 40 INJECTION SUBCUTANEOUS at 04:12

## 2022-12-08 RX ADMIN — SODIUM CHLORIDE, PRESERVATIVE FREE 10 ML: 5 INJECTION INTRAVENOUS at 06:12

## 2022-12-08 RX ADMIN — POTASSIUM CHLORIDE 10 MEQ: 7.46 INJECTION, SOLUTION INTRAVENOUS at 03:12

## 2022-12-08 RX ADMIN — LORAZEPAM 2 MG: 2 INJECTION INTRAMUSCULAR; INTRAVENOUS at 05:12

## 2022-12-08 RX ADMIN — POTASSIUM CHLORIDE 10 MEQ: 7.46 INJECTION, SOLUTION INTRAVENOUS at 04:12

## 2022-12-08 RX ADMIN — LORAZEPAM 2 MG: 2 INJECTION INTRAMUSCULAR; INTRAVENOUS at 04:12

## 2022-12-08 RX ADMIN — VANCOMYCIN HYDROCHLORIDE 750 MG: 750 INJECTION, POWDER, LYOPHILIZED, FOR SOLUTION INTRAVENOUS at 05:12

## 2022-12-08 NOTE — PROGRESS NOTES
[facility] - ICU  Pulmonary Critical Care Note    Patient Name: Criselda Cannon  MRN: [id number]  Admission Date: 12/7/2022  Hospital Length of Stay: 1 days  Code Status: Full Code  Attending Provider: Eliud Peters MD  Primary Care Provider: Primary Doctor No     Subjective:     HPI:   43 yo F with past medical history of anxiety/depression and Bipolar I disorder per chart review who was brought in by neighbor in cardiac arrest. History obtained from ED provider who spoke with individual prior to his departure from ED. Patient has been living [place] a tent [place] local campground and individual has been allowing patient to stay in his van when the weather is bad. The individual left for approximately 20 minutes and upon his return the patient was found unresponsive and not breathing. He began CPR which was performed intermittently until their arrival to ED. Drive from [facility] to ED took approximately 20-30 minutes. Patient with history of drug abuse.     24 Hour Interval History:  No significant change in neuro status overnight.  Patient appears to continue to have anoxic twitch, unchanged with propofol.  Was also given IV Ativan overnight.  EEG yesterday showed findings consistent with anoxia, no seizure activity.  Remains with some brainstem activity as she breathes over the vent.  No longer requiring any vasopressor support, titrating mechanical ventilation settings and reducing FiO2.  ABG this morning looked good.  Talked to patient's  today    Past Medical History:   Diagnosis Date    Anxiety     Bipolar 1 disorder     Depression      History reviewed. No pertinent surgical history.    Social History     Socioeconomic History    Marital status: Single   Tobacco Use    Smoking status: Every Day     Packs/day: 1.00     Types: Cigarettes   Substance and Sexual Activity    Alcohol use: No    Drug use: Yes    Sexual activity: Yes     Current Outpatient Medications   Medication Instructions     ARIPiprazole (ABILIFY) 5 mg, Daily    clonazePAM (KLONOPIN) 1 mg, 2 times daily PRN    hydrOXYzine HCL (ATARAX) 25 mg, Oral, Every 6 hours    MEDROXYPROGESTERONE ACETATE (DEPO-PROVERA IM) Inject into the muscle.    sertraline (ZOLOFT) 100 mg, Daily    traZODone (DESYREL) 100 mg, Nightly   Current Inpatient Medications   enoxaparin  40 mg Subcutaneous Daily    famotidine (PF)  20 mg Intravenous BID    levetiracetam IV  1,000 mg Intravenous Q12H    LORazepam        mupirocin   Nasal BID    perflutren protein-a microsphr  2 mL Intravenous Once    piperacillin-tazobactam (ZOSYN) IVPB  4.5 g Intravenous Q8H    sodium chloride 0.9%  10 mL Intravenous Q6H    vancomycin (VANCOCIN) IVPB  750 mg Intravenous Q12H   Current Intravenous Infusions   lactated ringers 100 mL/hr at 12/08/22 0529    NORepinephrine bitartrate-D5W 0 mcg/kg/min (12/07/22 0807)    propofoL 40 mcg/kg/min (12/08/22 0638)     Review of Systems   Unable to perform ROS: Intubated      Objective:       Intake/Output Summary (Last 24 hours) at 12/8/2022 1137  Last data filed at 12/8/2022 1009  Gross per 24 hour   Intake 4141.55 ml   Output 1170 ml   Net 2971.55 ml     Vital Signs (Most Recent):  Temp: 96.4 °F (35.8 °C) (12/08/22 0830)  Pulse: 100 (12/08/22 1000)  Resp: (!) 28 (12/08/22 1000)  BP: (!) 102/57 (12/08/22 0800)  SpO2: 100 % (12/08/22 1000)    Body mass index is 20.12 kg/m².  Weight: 49.9 kg (110 lb) Vital Signs (24h Range):  Temp:  [95 °F (35 °C)-98.2 °F (36.8 °C)] 96.4 °F (35.8 °C)  Pulse:  [] 100  Resp:  [20-44] 28  SpO2:  [100 %] 100 %  BP: ()/() 102/57  Arterial Line BP: ()/(49-90) 106/53     Physical Exam  Constitutional:       Interventions: She is sedated and intubated.   HENT:      Head: Normocephalic and atraumatic.   Eyes:      Comments: Bruising around right eye.  Pupils reactive but sluggish   Cardiovascular:      Rate and Rhythm: Normal rate and regular rhythm.      Pulses: Normal pulses.      Heart sounds:  Normal heart sounds.   Pulmonary:      Effort: Pulmonary effort is normal. She is intubated.      Breath sounds: Normal breath sounds.   Abdominal:      General: Abdomen is flat. There is no distension.   Neurological:      Comments: Neuro status unchanged from yesterday.  No cough reflex, no gag, no corneal.  Pupils slightly reactive.  Continues to breathe over the vent.     Lines/Drains/Airways       Peripherally Inserted Central Catheter Line  Duration             PICC Triple Lumen 12/07/22 1154 left basilic <1 day              Drain  Duration                  NG/OG Tube 12/07/22 0206 [place] sump 16 Fr. Right nostril 1 day         Urethral Catheter 12/07/22 0220 Double-lumen 16 Fr. 1 day              Airway  Duration                  Airway - Non-Surgical 12/07/22 0206 1 day              Arterial Line  Duration             Arterial Line 12/07/22 1621 Right Radial <1 day              Peripheral Intravenous Line  Duration                  Peripheral IV - Single Lumen 12/07/22 0416 18 G Distal;Left;Posterior Forearm 1 day         Peripheral IV - Single Lumen 12/07/22 0640 20 G Right Antecubital 1 day                Significant Labs:  Lab Results   Component Value Date    WBC 10.9 12/08/2022    HGB 9.9 (L) 12/08/2022    HCT 29.1 (L) 12/08/2022    MCV 86.9 12/08/2022     12/08/2022   BMP  Lab Results   Component Value Date     12/08/2022    K 3.7 12/08/2022     07/06/2013    CO2 21 (L) 12/08/2022    BUN 16.4 12/08/2022    CREATININE 0.60 12/08/2022    CALCIUM 7.4 (L) 12/08/2022    ANIONGAP 14 07/06/2013    ESTGFRAFRICA >60 07/06/2013    EGFRNONAA >60 07/06/2013   ABG  Recent Labs   Lab 12/08/22 0809   PH 7.37   PO2 296*   PCO2 40   HCO3 23.1   Mechanical Ventilation Support:  Vent Mode: A/C (12/08/22 0937)  Ventilator Initiated: Yes (12/07/22 0206)  Set Rate: 26 BPM (12/08/22 0937)  Vt Set: 400 mL (12/08/22 0937)  PEEP/CPAP: 5 cmH20 (12/08/22 0937)  Oxygen Concentration (%): 80 (12/08/22 0937)  Peak  Airway Pressure: 20.7 cmH20 (12/08/22 0937)  Plateau Pressure: 19 cmH20 (12/08/22 0745)  Total Ve: 11.9 L/m (12/08/22 0937)  F/VT Ratio<105 (RSBI): (!) 70.02 (12/08/22 0300)  Significant Imaging:  I have reviewed the pertinent imaging within the past 24 hours.  Assessment/Plan:     Assessment  Out-of-hospital cardiac arrest with ROSC achieved after unknown amount of time   Polysubstance abuse with acute intoxication   Acute hypoxemic respiratory failure   Anoxic brain injury  Possible aspiration pneumonia     Plan  -continuing mechanical ventilation, weaning settings as tolerated.  Continues to saturate well   -remains on propofol for sedation, thought to maybe help with twitching.  Can likely wean as it as seemed to have no effect   -no longer requiring any vasopressor support, blood pressure has been better with propofol on board   -cooled for 24 hours to 36° C, cooling blankets taken off this morning   -continuing Keppra 1000 mg every 12 hours for possible seizure activity  -continuing vanc and Zosyn for now, can deescalate as cultures return    Had extensive discussion with  this morning.  Informed him of the severity of the condition and overall grim prognosis.  He states he would like to continue to take a little bit of time to evaluate for possible neurologic improvement before making decision on withdrawal.  He did decide to make patient DNR at this time    DVT Prophylaxis:  Lovenox  GI Prophylaxis:  Laisha Thomas,   Pulmonary Critical Care Medicine  [facility] - ICU

## 2022-12-08 NOTE — PLAN OF CARE
12/08/22 1152   Discharge Assessment   Assessment Type Discharge Planning Assessment   Confirmed/corrected address, phone number and insurance Yes   Confirmed Demographics Correct on Facesheet   Source of Information family   Reason For Admission Cardiac arrest, Lactic acidosis, Anoxic brain injury   People in Home spouse   Facility Arrived From: Home   Do you expect to return to your current living situation? Yes   Do you have help at home or someone to help you manage your care at home? Yes   Who are your caregiver(s) and their phone number(s)? Kiet Cannon (Significant other)   [phone]   Prior to hospitilization cognitive status: Alert/Oriented   Current cognitive status: Coma/Sedated/Intubated   Equipment Currently Used at Home none   Patient currently being followed by outpatient case management? No   Do you currently have service(s) that help you manage your care at home? No   Do you have prescription coverage? Yes   Coverage [facility] Community Plan M/D   Do you have any problems affording any of your prescribed medications? No   Who is going to help you get home at discharge? Family   How do you get to doctors appointments? car, drives self   Are you on dialysis? No   Discharge Plan A Home with family   Discharge Plan B Comfort care/withdrawal   Discharge Plan discussed with: Spouse/sig other   Name(s) and Number(s) Kiet Cannon (Significant other)   [phone]   Discharge Barriers Identified Substance Abuse   Financial Resource Strain   How hard is it for you to pay for the very basics like food, housing, medical care, and heating? Not hard   Housing Stability   In the last 12 months, was there a time when you were not able to pay the mortgage or rent on time? N   In the last 12 months, how many places have you lived? 4   In the last 12 months, was there a time when you did not have a steady place to sleep or slept in a shelter (including now)? N   Transportation Needs   In the past 12 months, has lack of  "transportation kept you from medical appointments or from getting medications? no   In the past 12 months, has lack of transportation kept you from meetings, work, or from getting things needed for daily living? No   Food Insecurity   Within the past 12 months, you worried that your food would run out before you got the money to buy more. Never true   Within the past 12 months, the food you bought just didn't last and you didn't have money to get more. Never true   Social Connections   In a typical week, how many times do you talk on the phone with family, friends, or neighbors? More than 3   How often do you get together with friends or relatives? More than 3   How often do you attend Orthodox or Faith services? Never  (Adventism)   Do you belong to any clubs or organizations such as Orthodox groups, unions, fraternal or athletic groups, or school groups? No   How often do you attend meetings of the clubs or organizations you belong to? Never   Are you , , , , never , or living with a partner?    Alcohol Use   Q1: How often do you have a drink containing alcohol? Never  (Hx of substance abuse-Cocaine drug of choice)   Q2: How many drinks containing alcohol do you have on a typical day when you are drinking? None   Q3: How often do you have six or more drinks on one occasion? Never   OTHER   Name(s) of People in Home Kiet Cannon (Significant other)   [phone]   Met with spouse, Kiet, who reported pt estranged from mother & her 23 & 25 yr old children in [place]; whom spouse is making efforts to contact re pt's situation. Spouse stated pt has hx of extensive drug use; and that he has nursed her "back to normal" on 4 or 5 occasions in the 13 yrs (7 ) they have been together.  "

## 2022-12-08 NOTE — CARE UPDATE
Spoke with patient's  Kiet Cannon (phone number [phone]) regarding patient's status. Discussed severity of anoxic brain injury. All questions answered to best of my ability.  will be driving in from [place] to see patient.     Diane Thomason MD  U  Resident. [facility]-II

## 2022-12-08 NOTE — PROGRESS NOTES
Inpatient Nutrition Assessment    Admit Date: 12/7/2022   Total duration of encounter: 1 day     Nutrition Recommendation/Prescription     Pt remains NPO/ S/P cardiac arrest. Pt now on propofol @ 14.4ml/hr--380 calories. When stable --suggest use enteral feeds--Peptamen Intense VHP @ 20ml/hr; increase as tolerated to goal rate 45ml/hr (23 hr cycle) to provide 1035 calories, 95 gm protein, 869 ml free water. (TF + propofol = 1415 calories).   When extubated--ADAT to regular /boost tid  MVI/fe  Biweekly wt  Will monitor nutrition status     Communication of Recommendations: reviewed with nurse    Nutrition Assessment     Malnutrition Assessment/Nutrition-Focused Physical Exam    Malnutrition in the context of acute illness or injury  Degree of Malnutrition: unable to complete  Energy Intake: unable to obtain  Interpretation of Weight Loss: unable to obtain  Body Fat:unable to obtain  Area of Body Fat Loss: unable to obtain  Muscle Mass Loss: unable to obtain  Area of Muscle Mass Loss: unable to obtain  Fluid Accumulation: unable to obtain  Edema: unable to obtain   Reduced  Strength: unable to obtain  A minimum of two characteristics is recommended for diagnosis of either severe or non-severe malnutrition.    Chart Review    Reason Seen: continuous nutrition monitoring, malnutrition screening tool, and follow-up    Diagnosis:  Cardiac Arrest, Anoxic Brain Injury, lactic acidosis, prolonged QT/ECG; aspiration PNA 2 vomit     Relevant Medical History: drug abuse, anxiety, bipolar depression     Nutrition-Related Medications: IVF LR @ 100ml/hr, levophed, famotidine, zosyn, vancomycin ; propofol @ 14ml/hr   Calorie Containing IV Medications: no significant kcals from medications at this time and Diprivan @ 14 ml/hr (provides 380 kcal/d)    Nutrition-Related Labs:  (12-7) Gluc 217(H) Bun 22 Cr 0.7 K 3.7   (12/8) H/H 9.9/29.1(L) Gluc 85 Bun 17.4 Cr 0.6 K 3.3(L) Alb 2.7(L) (H) (H)     Diet/PN Order: Diet  "NPO  Oral Supplement Order: none  Tube Feeding Order: none  Appetite/Oral Intake: NPO/NPO  Factors Affecting Nutritional Intake: impaired cognitive status/motor control, NPO, and on mechanical ventilation  Food/Holiness/Cultural Preferences: unable to obtain  Food Allergies: unable to obtain       Wound(s):   none documented     Comments  (12/8) Pt remains on vent; had EEG done yesterday--confirming anoxic brain injury; RN reported family supposed to come today to discuss plan of care for pt. Wt 49.9kg. Labs acknowledged. Pt now on propofol; updated TF recs provided to account for calories from propofol.     (12/7) Received consult for MST trigger. Pt intubated; S/P code; currently having EEG done at bedside; suspected anoxic brain injury. No diet/or recent wt hx available. Unable to complete PA at this time 2 to EEG being completed. Pt has NGT; TF recs provided.     Anthropometrics    Height: 5' 2" (157.5 cm)    Last Weight: 49.9 kg (110 lb) (12/08/22 0600) Weight Method: Bed Scale  BMI (Calculated): 20.1  BMI Classification: normal (BMI 18.5-24.9)     Ideal Body Weight (IBW), Female: 110 lb     % Ideal Body Weight, Female (lb): 100 %                    Usual Body Weight (UBW), kg:  (pt on vent; unable to obtain)        Usual Weight Provided By: EMR weight history    Wt Readings from Last 5 Encounters:   12/08/22 49.9 kg (110 lb)   12/30/21 55.9 kg (123 lb 3.8 oz)   03/16/20 60.7 kg (133 lb 14.9 oz)   07/06/13 45.4 kg (100 lb)     Weight Change(s) Since Admission:  Admit Weight: 52.2 kg (115 lb) (12/07/22 0219)  (12/7) no recent wt hx; approx 10% wt loss over past year as per EMR.    Estimated Needs    Weight Used For Calorie Calculations: 50.1 kg (110 lb 7.2 oz)  Energy Calorie Requirements (kcal): 1252 kcal/d; 25 xenia/kg on vent  Energy Need Method: Kcal/kg  Weight Used For Protein Calculations: 50.1 kg (110 lb 7.2 oz)  Protein Requirements: 75 gm protein/d; 1.5 gm/kg  Fluid Requirements (mL): 1252 ml/d; " 1ml/xenia  Temp: 96.4 °F (35.8 °C)       Enteral Nutrition    Patient not receiving enteral nutrition at this time.    Parenteral Nutrition    Patient not receiving parenteral nutrition support at this time.    Evaluation of Received Nutrient Intake    Calories: not meeting estimated needs  Protein: not meeting estimated needs    Patient Education    Not applicable.    Nutrition Diagnosis     PES: Inadequate oral intake related to cardiac arrest/vent as evidenced by NPO. (continues)    Interventions/Goals     Intervention(s): general/healthful diet, modified composition of enteral nutrition, modified rate of enteral nutrition, multivitamin/mineral supplement therapy, and collaboration with other providers  Goal: Meet greater than 75% of nutritional needs by follow-up. (goal not met)    Monitoring & Evaluation     Dietitian will monitor food and beverage intake, enteral nutrition intake, and weight.  Nutrition Risk/Follow-Up: moderate (follow-up in 3-5 days)   Please consult if re-assessment needed sooner.

## 2022-12-08 NOTE — PROCEDURES
Arterial Catheter Insertion Procedure Note     Procedure: Insertion of Arterial Catheter     Indications: Hemodynamic Monitoring     Procedure Details     Maximum sterile technique was used including antiseptics, sterile gloves, hand hygiene, mask and sterile maximum barrier drape.     Under sterile conditions the skin above the right radial  artery was prepped with Chloroprep and covered with a sterile drape. Local anesthesia was applied to the skin and subcutaneous tissues. Ultrasound guidance was used to identify the artery. A 20 -gauge catheter over a needle was then inserted into the artery. A guide wire was then passed easily through the needle. The needle was then withdrawn. A arterial catheter was then inserted into the vessel over the guide wire. The needle was then withdrawn and was connected to the monitor to ensure a proper waveform. A sterile dressing was applied.     Ultrasound/Sonosite was used during the procedure.      Estimated blood loss: 10 ml    Patient tolerated procedure well.    Dae Thomas, DO  12/7/2022

## 2022-12-09 LAB
ABS NEUT CALC (OHS): 9.59 X10(3)/MCL (ref 2.1–9.2)
ALBUMIN SERPL-MCNC: 2.4 GM/DL (ref 3.5–5)
ALBUMIN/GLOB SERPL: 0.9 RATIO (ref 1.1–2)
ALP SERPL-CCNC: 96 UNIT/L (ref 40–150)
ALT SERPL-CCNC: 123 UNIT/L (ref 0–55)
ANISOCYTOSIS BLD QL SMEAR: ABNORMAL
APTT PPP: 41.5 SECONDS
AST SERPL-CCNC: 132 UNIT/L (ref 5–34)
BACTERIA UR CULT: NO GROWTH
BILIRUBIN DIRECT+TOT PNL SERPL-MCNC: 0.8 MG/DL
BUN SERPL-MCNC: 14.1 MG/DL (ref 7–18.7)
CALCIUM SERPL-MCNC: 8.1 MG/DL (ref 8.4–10.2)
CHLORIDE SERPL-SCNC: 110 MMOL/L (ref 98–107)
CO2 SERPL-SCNC: 20 MMOL/L (ref 22–29)
CORRECTED TEMPERATURE (PCO2): 43 MMHG (ref 35–45)
CORRECTED TEMPERATURE (PH): 7.33 (ref 7.35–7.45)
CORRECTED TEMPERATURE (PO2): 190 MMHG (ref 75–100)
CREAT SERPL-MCNC: 0.57 MG/DL (ref 0.55–1.02)
ERYTHROCYTE [DISTWIDTH] IN BLOOD BY AUTOMATED COUNT: 13.2 % (ref 11.5–17)
GFR SERPLBLD CREATININE-BSD FMLA CKD-EPI: >60 MLS/MIN/1.73/M2
GLOBULIN SER-MCNC: 2.7 GM/DL (ref 2.4–3.5)
GLUCOSE SERPL-MCNC: 106 MG/DL (ref 74–100)
HCO3 UR-SCNC: 22.7 MMOL/L (ref 22–26)
HCT VFR BLD AUTO: 27.5 % (ref 37–47)
HGB BLD-MCNC: 10.5 G/DL (ref 12–18)
HGB BLD-MCNC: 9.1 GM/DL (ref 12–16)
IMM GRANULOCYTES # BLD AUTO: 0.09 X10(3)/MCL (ref 0–0.04)
IMM GRANULOCYTES NFR BLD AUTO: 0.8 %
LYMPHOCYTES NFR BLD MANUAL: 0.98 X10(3)/MCL
LYMPHOCYTES NFR BLD MANUAL: 9 % (ref 13–40)
MAGNESIUM SERPL-MCNC: 2.1 MG/DL (ref 1.6–2.6)
MCH RBC QN AUTO: 29.9 PG (ref 27–31)
MCHC RBC AUTO-ENTMCNC: 33.1 MG/DL (ref 33–36)
MCV RBC AUTO: 90.5 FL (ref 80–94)
METAMYELOCYTES NFR BLD MANUAL: 4 %
MONOCYTES NFR BLD MANUAL: 0.33 X10(3)/MCL (ref 0.1–1.3)
MONOCYTES NFR BLD MANUAL: 3 % (ref 2–11)
NEUTROPHILS NFR BLD MANUAL: 72 % (ref 47–80)
NEUTS BAND NFR BLD MANUAL: 12 % (ref 0–11)
NRBC BLD AUTO-RTO: 0 %
NSE SERPL-MCNC: 15 NG/ML
PCO2 BLDA: 43 MMHG (ref 35–45)
PEEP: 5 CM H2O
PH SMN: 7.33 [PH] (ref 7.35–7.45)
PHOSPHATE SERPL-MCNC: 2.1 MG/DL (ref 2.3–4.7)
PLATELET # BLD AUTO: 147 X10(3)/MCL (ref 130–400)
PLATELET # BLD EST: ADEQUATE 10*3/UL
PMV BLD AUTO: 12.2 FL (ref 7.4–10.4)
PO2 BLDA: 190 MMHG (ref 75–100)
POC BASE DEFICIT: -3.1 MMOL/L (ref -2–2)
POC COHB: 1.1 % (ref 0.5–1.5)
POC METHB: 0.8 % (ref 0–1.5)
POC O2HB: 97.7 % (ref 94–100)
POC PERFORMED BY: ABNORMAL
POC SATURATED O2: 99.6 % (ref 90–100)
POC TEMPERATURE: 37 °C
POCT GLUCOSE: 103 MG/DL (ref 70–110)
POCT GLUCOSE: 113 MG/DL (ref 70–110)
POCT GLUCOSE: 132 MG/DL (ref 70–110)
POCT GLUCOSE: 174 MG/DL (ref 70–110)
POCT GLUCOSE: 88 MG/DL (ref 70–110)
POTASSIUM SERPL-SCNC: 3.1 MMOL/L (ref 3.5–5.1)
PROT SERPL-MCNC: 5.1 GM/DL (ref 6.4–8.3)
RBC # BLD AUTO: 3.04 X10(6)/MCL (ref 4.2–5.4)
RBC MORPH BLD: ABNORMAL
SODIUM SERPL-SCNC: 140 MMOL/L (ref 136–145)
SPECIMEN SOURCE: ABNORMAL
WBC # SPEC AUTO: 10.9 X10(3)/MCL (ref 4.5–11.5)

## 2022-12-09 PROCEDURE — 85007 BL SMEAR W/DIFF WBC COUNT: CPT | Performed by: STUDENT IN AN ORGANIZED HEALTH CARE EDUCATION/TRAINING PROGRAM

## 2022-12-09 PROCEDURE — 85027 COMPLETE CBC AUTOMATED: CPT | Performed by: STUDENT IN AN ORGANIZED HEALTH CARE EDUCATION/TRAINING PROGRAM

## 2022-12-09 PROCEDURE — 20000000 HC ICU ROOM

## 2022-12-09 PROCEDURE — 85610 PROTHROMBIN TIME: CPT | Performed by: STUDENT IN AN ORGANIZED HEALTH CARE EDUCATION/TRAINING PROGRAM

## 2022-12-09 PROCEDURE — 27000221 HC OXYGEN, UP TO 24 HOURS

## 2022-12-09 PROCEDURE — 80053 COMPREHEN METABOLIC PANEL: CPT | Performed by: STUDENT IN AN ORGANIZED HEALTH CARE EDUCATION/TRAINING PROGRAM

## 2022-12-09 PROCEDURE — 25000003 PHARM REV CODE 250: Performed by: INTERNAL MEDICINE

## 2022-12-09 PROCEDURE — 36415 COLL VENOUS BLD VENIPUNCTURE: CPT | Performed by: STUDENT IN AN ORGANIZED HEALTH CARE EDUCATION/TRAINING PROGRAM

## 2022-12-09 PROCEDURE — 99291 CRITICAL CARE FIRST HOUR: CPT | Mod: ,,, | Performed by: INTERNAL MEDICINE

## 2022-12-09 PROCEDURE — 99900035 HC TECH TIME PER 15 MIN (STAT)

## 2022-12-09 PROCEDURE — 94761 N-INVAS EAR/PLS OXIMETRY MLT: CPT

## 2022-12-09 PROCEDURE — 63600175 PHARM REV CODE 636 W HCPCS: Performed by: STUDENT IN AN ORGANIZED HEALTH CARE EDUCATION/TRAINING PROGRAM

## 2022-12-09 PROCEDURE — 83735 ASSAY OF MAGNESIUM: CPT | Performed by: STUDENT IN AN ORGANIZED HEALTH CARE EDUCATION/TRAINING PROGRAM

## 2022-12-09 PROCEDURE — 37799 UNLISTED PX VASCULAR SURGERY: CPT

## 2022-12-09 PROCEDURE — 85730 THROMBOPLASTIN TIME PARTIAL: CPT | Performed by: STUDENT IN AN ORGANIZED HEALTH CARE EDUCATION/TRAINING PROGRAM

## 2022-12-09 PROCEDURE — 84100 ASSAY OF PHOSPHORUS: CPT | Performed by: STUDENT IN AN ORGANIZED HEALTH CARE EDUCATION/TRAINING PROGRAM

## 2022-12-09 PROCEDURE — 25000003 PHARM REV CODE 250: Performed by: STUDENT IN AN ORGANIZED HEALTH CARE EDUCATION/TRAINING PROGRAM

## 2022-12-09 PROCEDURE — 99291 PR CRITICAL CARE, E/M 30-74 MINUTES: ICD-10-PCS | Mod: ,,, | Performed by: INTERNAL MEDICINE

## 2022-12-09 PROCEDURE — 99900026 HC AIRWAY MAINTENANCE (STAT)

## 2022-12-09 PROCEDURE — 94003 VENT MGMT INPAT SUBQ DAY: CPT

## 2022-12-09 PROCEDURE — A4216 STERILE WATER/SALINE, 10 ML: HCPCS | Performed by: STUDENT IN AN ORGANIZED HEALTH CARE EDUCATION/TRAINING PROGRAM

## 2022-12-09 PROCEDURE — 63600175 PHARM REV CODE 636 W HCPCS: Performed by: INTERNAL MEDICINE

## 2022-12-09 PROCEDURE — 82803 BLOOD GASES ANY COMBINATION: CPT

## 2022-12-09 PROCEDURE — 27200966 HC CLOSED SUCTION SYSTEM

## 2022-12-09 PROCEDURE — 83520 IMMUNOASSAY QUANT NOS NONAB: CPT | Performed by: STUDENT IN AN ORGANIZED HEALTH CARE EDUCATION/TRAINING PROGRAM

## 2022-12-09 RX ADMIN — DEXTROSE MONOHYDRATE: 50 INJECTION, SOLUTION INTRAVENOUS at 07:12

## 2022-12-09 RX ADMIN — VANCOMYCIN HYDROCHLORIDE 750 MG: 750 INJECTION, POWDER, LYOPHILIZED, FOR SOLUTION INTRAVENOUS at 09:12

## 2022-12-09 RX ADMIN — PIPERACILLIN AND TAZOBACTAM 4.5 G: 4; .5 INJECTION, POWDER, LYOPHILIZED, FOR SOLUTION INTRAVENOUS; PARENTERAL at 02:12

## 2022-12-09 RX ADMIN — SODIUM CHLORIDE, PRESERVATIVE FREE 10 ML: 5 INJECTION INTRAVENOUS at 11:12

## 2022-12-09 RX ADMIN — PIPERACILLIN AND TAZOBACTAM 4.5 G: 4; .5 INJECTION, POWDER, LYOPHILIZED, FOR SOLUTION INTRAVENOUS; PARENTERAL at 06:12

## 2022-12-09 RX ADMIN — FAMOTIDINE 20 MG: 10 INJECTION, SOLUTION INTRAVENOUS at 09:12

## 2022-12-09 RX ADMIN — MUPIROCIN: 20 OINTMENT TOPICAL at 08:12

## 2022-12-09 RX ADMIN — MUPIROCIN: 20 OINTMENT TOPICAL at 09:12

## 2022-12-09 RX ADMIN — ENOXAPARIN SODIUM 40 MG: 40 INJECTION SUBCUTANEOUS at 04:12

## 2022-12-09 RX ADMIN — DEXTROSE MONOHYDRATE: 50 INJECTION, SOLUTION INTRAVENOUS at 10:12

## 2022-12-09 RX ADMIN — VANCOMYCIN HYDROCHLORIDE 750 MG: 750 INJECTION, POWDER, LYOPHILIZED, FOR SOLUTION INTRAVENOUS at 08:12

## 2022-12-09 RX ADMIN — LEVETIRACETAM INJECTION 1000 MG: 5 INJECTION INTRAVENOUS at 09:12

## 2022-12-09 RX ADMIN — SODIUM CHLORIDE, PRESERVATIVE FREE 10 ML: 5 INJECTION INTRAVENOUS at 06:12

## 2022-12-09 RX ADMIN — LEVETIRACETAM INJECTION 1000 MG: 5 INJECTION INTRAVENOUS at 08:12

## 2022-12-09 RX ADMIN — FAMOTIDINE 20 MG: 10 INJECTION, SOLUTION INTRAVENOUS at 08:12

## 2022-12-09 RX ADMIN — SODIUM CHLORIDE, PRESERVATIVE FREE 10 ML: 5 INJECTION INTRAVENOUS at 05:12

## 2022-12-09 NOTE — PROGRESS NOTES
Returned call to pt's mother, April ([phone]) who was able to visit this a.m. Provided emotional support. Mother accepting of plan to withdraw care over weekend.

## 2022-12-09 NOTE — PROGRESS NOTES
[facility] - ICU  Pulmonary Critical Care Note    Patient Name: Criselda Cannon  MRN: [id number]  Admission Date: 12/7/2022  Hospital Length of Stay: 2 days  Code Status: DNR  Attending Provider: Eliud Peters MD  Primary Care Provider: Primary Doctor No     Subjective:     HPI:   41 yo F with past medical history of anxiety/depression and Bipolar I disorder per chart review who was brought in by neighbor in cardiac arrest. History obtained from ED provider who spoke with individual prior to his departure from ED. Patient has been living [place] tent [place] and individual has been allowing patient to stay in his van when the weather is bad. The individual left for approximately 20 minutes and upon his return the patient was found unresponsive and not breathing. He began CPR which was performed intermittently until their arrival to ED. Drive from [facility] to ED took approximately 20-30 minutes. Patient with history of drug abuse.     24 Hour Interval History:  Patient no longer on propofol for sedation, not having anymore twitching movements.  Blood pressure slightly increased after this but patient has remained hemodynamically stable.  ABG in good range, FiO2 turned down this morning.  Remains on Keppra and antibiotic.  No episodes of fever.    Past Medical History:   Diagnosis Date    Anxiety     Bipolar 1 disorder     Depression      History reviewed. No pertinent surgical history.    Social History     Socioeconomic History    Marital status: Single   Tobacco Use    Smoking status: Every Day     Packs/day: 1.00     Types: Cigarettes   Substance and Sexual Activity    Alcohol use: No    Drug use: Yes    Sexual activity: Yes     Social Determinants of Health     Financial Resource Strain: Low Risk     Difficulty of Paying Living Expenses: Not hard at all   Food Insecurity: No Food Insecurity    Worried About Running Out of Food in the Last Year: Never true    Ran Out of Food in the Last  Year: Never true   Transportation Needs: No Transportation Needs    Lack of Transportation (Medical): No    Lack of Transportation (Non-Medical): No   Social Connections: Moderately Isolated    Frequency of Communication with Friends and Family: More than three times a week    Frequency of Social Gatherings with Friends and Family: More than three times a week    Attends [employer]ism Services: Never    Active Member of Clubs or Organizations: No    Attends Club or Organization Meetings: Never    Marital Status:    Housing Stability: High Risk    Unable to Pay for Housing in the Last Year: No    Number of Places Lived in the Last Year: 4    Unstable Housing in the Last Year: No     Current Outpatient Medications   Medication Instructions    ARIPiprazole (ABILIFY) 5 mg, Daily    clonazePAM (KLONOPIN) 1 mg, 2 times daily PRN    hydrOXYzine HCL (ATARAX) 25 mg, Oral, Every 6 hours    MEDROXYPROGESTERONE ACETATE (DEPO-PROVERA IM) Inject into the muscle.    sertraline (ZOLOFT) 100 mg, Daily    traZODone (DESYREL) 100 mg, Nightly   Current Inpatient Medications   enoxaparin  40 mg Subcutaneous Daily    famotidine (PF)  20 mg Intravenous BID    levetiracetam IV  1,000 mg Intravenous Q12H    mupirocin   Nasal BID    perflutren protein-a microsphr  2 mL Intravenous Once    piperacillin-tazobactam (ZOSYN) IVPB  4.5 g Intravenous Q8H    sodium chloride 0.9%  10 mL Intravenous Q6H    vancomycin (VANCOCIN) IVPB  750 mg Intravenous Q12H   Current Intravenous Infusions   dextrose 5 % 75 mL/hr at 12/09/22 0703    NORepinephrine bitartrate-D5W 0 mcg/kg/min (12/07/22 0807)    propofoL Stopped (12/09/22 0232)     Review of Systems   Unable to perform ROS: Intubated    Objective:       Intake/Output Summary (Last 24 hours) at 12/9/2022 0805  Last data filed at 12/9/2022 0636  Gross per 24 hour   Intake 3484.65 ml   Output 1340 ml   Net 2144.65 ml     Vital Signs (Most Recent):  Temp: 97.4 °F (36.3 °C) (12/09/22 0330)  Pulse: 82  (12/09/22 0725)  Resp: (!) 26 (12/09/22 0600)  BP: (!) 146/88 (12/09/22 0523)  SpO2: 100 % (12/09/22 0725)    Body mass index is 24.8 kg/m².  Weight: 61.5 kg (135 lb 9.3 oz) Vital Signs (24h Range):  Temp:  [96.4 °F (35.8 °C)-97.7 °F (36.5 °C)] 97.4 °F (36.3 °C)  Pulse:  [] 82  Resp:  [26-34] 26  SpO2:  [100 %] 100 %  BP: ()/(56-88) 146/88  Arterial Line BP: (101-158)/(49-85) 158/85     Physical Exam  Constitutional:       Interventions: She is sedated and intubated.   Eyes:      Comments: Pupils now both dilated, nonreactive.  changed from yesterday.  Bruising around right eye   Cardiovascular:      Rate and Rhythm: Normal rate and regular rhythm.      Pulses: Normal pulses.      Heart sounds: Normal heart sounds.   Pulmonary:      Effort: Pulmonary effort is normal. She is intubated.      Breath sounds: Rhonchi and rales present.   Abdominal:      General: Abdomen is flat. There is no distension.   Skin:     General: Skin is warm.      Coloration: Skin is pale.   Neurological:      Comments: No cough reflex, no gag, no corneal reflexes.  No response to pain stimulation, not on any sedation.  Pupils dilated and nonresponsive.     Lines/Drains/Airways       Peripherally Inserted Central Catheter Line  Duration             PICC Triple Lumen 12/07/22 1154 left basilic 1 day              Drain  Duration                  NG/OG Tube 12/07/22 0206 [place] sump 16 Fr. Right nostril 2 days         Urethral Catheter 12/07/22 0220 Double-lumen 16 Fr. 2 days              Airway  Duration                  Airway - Non-Surgical 12/07/22 0206 2 days              Arterial Line  Duration             Arterial Line 12/07/22 1621 Right Radial 1 day              Peripheral Intravenous Line  Duration                  Peripheral IV - Single Lumen 12/07/22 0416 18 G Distal;Left;Posterior Forearm 2 days         Peripheral IV - Single Lumen 12/07/22 0640 20 G Right Antecubital 2 days                Significant Labs:  Lab Results    Component Value Date    WBC 10.9 12/09/2022    HGB 9.1 (L) 12/09/2022    HCT 27.5 (L) 12/09/2022    MCV 90.5 12/09/2022     12/09/2022   BMP  Lab Results   Component Value Date     12/09/2022    K 3.1 (L) 12/09/2022     07/06/2013    CO2 20 (L) 12/09/2022    BUN 14.1 12/09/2022    CREATININE 0.57 12/09/2022    CALCIUM 8.1 (L) 12/09/2022    ANIONGAP 14 07/06/2013    ESTGFRAFRICA >60 07/06/2013    EGFRNONAA >60 07/06/2013   ABG  Recent Labs   Lab 12/09/22 0524   PH 7.33*   PO2 190*   PCO2 43   HCO3 22.7   Mechanical Ventilation Support:  Vent Mode: A/C (12/09/22 0725)  Ventilator Initiated: Yes (12/07/22 0206)  Set Rate: 26 BPM (12/09/22 0725)  Vt Set: 400 mL (12/09/22 0725)  PEEP/CPAP: 5 cmH20 (12/09/22 0725)  Oxygen Concentration (%): 50 (12/09/22 0725)  Peak Airway Pressure: 19.9 cmH20 (12/09/22 0725)  Plateau Pressure: 16 cmH20 (12/09/22 0725)  Total Ve: 9.99 L/m (12/09/22 0725)  F/VT Ratio<105 (RSBI): (!) 69.15 (12/09/22 0523)  Significant Imaging:  I have reviewed the pertinent imaging within the past 24 hours.  Assessment/Plan:     Assessment  Out-of-hospital cardiac arrest with ROSC achieved after unknown amount of time   Polysubstance abuse with acute intoxication   Acute hypoxemic respiratory failure   Anoxic brain injury  Possible aspiration pneumonia     Plan  -continuing mechanical ventilation and weaning as tolerated.  Patient not having any issues with oxygenating at this time   -no longer on any sedation, no change in neuro status once sedation turned off.    -BP back on higher side after propofol turned off, in okay range this morning   -continuing Keppra 1000 mg every 12 hours, does not appear to be having any seizure activity at this time   -continuing vancomycin and Zosyn for possible aspiration pneumonia   -no improvement in neuro status, pupils now dilated and nonresponsive.  has been 48 hours since original cardiac event, patient's  states he would like to wait until  our 72 before making decision on further goals of care.  Will continue to keep him updated on any changes today.     DVT Prophylaxis:  Lovenox  GI Prophylaxis:  Laisha Thomas DO  Pulmonary Critical Care Medicine  [facility] - ICU

## 2022-12-09 NOTE — PLAN OF CARE
Problem: Infection  Goal: Absence of Infection Signs and Symptoms  Outcome: Ongoing, Not Progressing     Problem: Adult Inpatient Plan of Care  Goal: Patient-Specific Goal (Individualized)  Outcome: Ongoing, Not Progressing  Goal: Readiness for Transition of Care  Outcome: Ongoing, Not Progressing     Problem: Communication Impairment (Mechanical Ventilation, Invasive)  Goal: Effective Communication  Outcome: Ongoing, Not Progressing

## 2022-12-10 ENCOUNTER — HOSPITAL ENCOUNTER (INPATIENT)
Facility: HOSPITAL | Age: 42
LOS: 2 days | DRG: 951 | End: 2022-12-12
Payer: MEDICAID

## 2022-12-10 LAB
ABORH RETYPE: NORMAL
ALBUMIN SERPL-MCNC: 2.3 GM/DL (ref 3.5–5)
ALBUMIN SERPL-MCNC: 2.4 GM/DL (ref 3.5–5)
ALBUMIN/GLOB SERPL: 0.7 RATIO (ref 1.1–2)
ALBUMIN/GLOB SERPL: 0.8 RATIO (ref 1.1–2)
ALP SERPL-CCNC: 107 UNIT/L (ref 40–150)
ALP SERPL-CCNC: 95 UNIT/L (ref 40–150)
ALT SERPL-CCNC: 70 UNIT/L (ref 0–55)
ALT SERPL-CCNC: 90 UNIT/L (ref 0–55)
AMYLASE SERPL-CCNC: 59 UNIT/L (ref 25–125)
APPEARANCE UR: CLEAR
APTT PPP: 37.3 SECONDS
APTT PPP: 37.7 SECONDS
AST SERPL-CCNC: 76 UNIT/L (ref 5–34)
AST SERPL-CCNC: 88 UNIT/L (ref 5–34)
BACTERIA #/AREA URNS AUTO: ABNORMAL /HPF
BASOPHILS # BLD AUTO: 0.01 X10(3)/MCL (ref 0–0.2)
BASOPHILS # BLD AUTO: 0.02 X10(3)/MCL (ref 0–0.2)
BASOPHILS # BLD AUTO: 0.02 X10(3)/MCL (ref 0–0.2)
BASOPHILS NFR BLD AUTO: 0.1 %
BASOPHILS NFR BLD AUTO: 0.2 %
BASOPHILS NFR BLD AUTO: 0.2 %
BILIRUB UR QL STRIP.AUTO: NEGATIVE MG/DL
BILIRUBIN DIRECT+TOT PNL SERPL-MCNC: 0.2 MG/DL (ref 0–0.5)
BILIRUBIN DIRECT+TOT PNL SERPL-MCNC: 0.5 MG/DL
BILIRUBIN DIRECT+TOT PNL SERPL-MCNC: 0.6 MG/DL
BUN SERPL-MCNC: 6.9 MG/DL (ref 7–18.7)
BUN SERPL-MCNC: 7.6 MG/DL (ref 7–18.7)
CALCIUM SERPL-MCNC: 8.8 MG/DL (ref 8.4–10.2)
CALCIUM SERPL-MCNC: 8.8 MG/DL (ref 8.4–10.2)
CHLORIDE SERPL-SCNC: 117 MMOL/L (ref 98–107)
CHLORIDE SERPL-SCNC: 117 MMOL/L (ref 98–107)
CK MB SERPL-MCNC: 1.1 NG/ML
CO2 SERPL-SCNC: 24 MMOL/L (ref 22–29)
CO2 SERPL-SCNC: 25 MMOL/L (ref 22–29)
COLOR UR AUTO: ABNORMAL
CORRECTED TEMPERATURE (PCO2): 33 MMHG (ref 35–45)
CORRECTED TEMPERATURE (PCO2): 34 MMHG (ref 35–45)
CORRECTED TEMPERATURE (PH): 7.51 (ref 7.35–7.45)
CORRECTED TEMPERATURE (PH): 7.54 (ref 7.35–7.45)
CORRECTED TEMPERATURE (PO2): 103 MMHG (ref 75–100)
CORRECTED TEMPERATURE (PO2): 115 MMHG (ref 75–100)
CREAT SERPL-MCNC: 0.58 MG/DL (ref 0.55–1.02)
CREAT SERPL-MCNC: 0.72 MG/DL (ref 0.55–1.02)
EOSINOPHIL # BLD AUTO: 0 X10(3)/MCL (ref 0–0.9)
EOSINOPHIL # BLD AUTO: 0 X10(3)/MCL (ref 0–0.9)
EOSINOPHIL # BLD AUTO: 0.02 X10(3)/MCL (ref 0–0.9)
EOSINOPHIL NFR BLD AUTO: 0 %
EOSINOPHIL NFR BLD AUTO: 0 %
EOSINOPHIL NFR BLD AUTO: 0.2 %
ERYTHROCYTE [DISTWIDTH] IN BLOOD BY AUTOMATED COUNT: 13.2 % (ref 11.5–17)
ERYTHROCYTE [DISTWIDTH] IN BLOOD BY AUTOMATED COUNT: 13.3 % (ref 11.5–17)
ERYTHROCYTE [DISTWIDTH] IN BLOOD BY AUTOMATED COUNT: 13.4 % (ref 11.5–17)
EST. AVERAGE GLUCOSE BLD GHB EST-MCNC: 93.9 MG/DL
FLUAV AG UPPER RESP QL IA.RAPID: NOT DETECTED
FLUBV AG UPPER RESP QL IA.RAPID: NOT DETECTED
GFR SERPLBLD CREATININE-BSD FMLA CKD-EPI: >60 MLS/MIN/1.73/M2
GFR SERPLBLD CREATININE-BSD FMLA CKD-EPI: >60 MLS/MIN/1.73/M2
GLOBULIN SER-MCNC: 3.2 GM/DL (ref 2.4–3.5)
GLOBULIN SER-MCNC: 3.2 GM/DL (ref 2.4–3.5)
GLUCOSE SERPL-MCNC: 125 MG/DL (ref 74–100)
GLUCOSE SERPL-MCNC: 133 MG/DL (ref 74–100)
GLUCOSE UR QL STRIP.AUTO: NORMAL MG/DL
GROUP & RH: NORMAL
HBA1C MFR BLD: 4.9 %
HCO3 UR-SCNC: 26.3 MMOL/L (ref 22–26)
HCO3 UR-SCNC: 29.1 MMOL/L (ref 22–26)
HCT VFR BLD AUTO: 25.1 % (ref 37–47)
HCT VFR BLD AUTO: 27.8 % (ref 37–47)
HCT VFR BLD AUTO: 28.6 % (ref 37–47)
HGB BLD-MCNC: 10 G/DL (ref 12–18)
HGB BLD-MCNC: 11.2 G/DL (ref 12–18)
HGB BLD-MCNC: 8.2 GM/DL (ref 12–16)
HGB BLD-MCNC: 9.2 GM/DL (ref 12–16)
HGB BLD-MCNC: 9.6 GM/DL (ref 12–16)
HYALINE CASTS #/AREA URNS LPF: ABNORMAL /LPF
IMM GRANULOCYTES # BLD AUTO: 0.09 X10(3)/MCL (ref 0–0.04)
IMM GRANULOCYTES # BLD AUTO: 0.11 X10(3)/MCL (ref 0–0.04)
IMM GRANULOCYTES # BLD AUTO: 0.36 X10(3)/MCL (ref 0–0.04)
IMM GRANULOCYTES NFR BLD AUTO: 1 %
IMM GRANULOCYTES NFR BLD AUTO: 1.4 %
IMM GRANULOCYTES NFR BLD AUTO: 3.4 %
INDIRECT COOMBS GEL: NORMAL
KETONES UR QL STRIP.AUTO: NEGATIVE MG/DL
LDH SERPL-CCNC: 439 U/L (ref 125–220)
LEUKOCYTE ESTERASE UR QL STRIP.AUTO: NEGATIVE UNIT/L
LIPASE SERPL-CCNC: 37 U/L
LYMPHOCYTES # BLD AUTO: 0.49 X10(3)/MCL (ref 0.6–4.6)
LYMPHOCYTES # BLD AUTO: 0.74 X10(3)/MCL (ref 0.6–4.6)
LYMPHOCYTES # BLD AUTO: 0.97 X10(3)/MCL (ref 0.6–4.6)
LYMPHOCYTES NFR BLD AUTO: 12.7 %
LYMPHOCYTES NFR BLD AUTO: 4.6 %
LYMPHOCYTES NFR BLD AUTO: 8.4 %
MAGNESIUM SERPL-MCNC: 2 MG/DL (ref 1.6–2.6)
MAGNESIUM SERPL-MCNC: 2.2 MG/DL (ref 1.6–2.6)
MCH RBC QN AUTO: 29.4 PG (ref 27–31)
MCH RBC QN AUTO: 29.7 PG (ref 27–31)
MCH RBC QN AUTO: 29.7 PG (ref 27–31)
MCHC RBC AUTO-ENTMCNC: 32.7 MG/DL (ref 33–36)
MCHC RBC AUTO-ENTMCNC: 33.1 MG/DL (ref 33–36)
MCHC RBC AUTO-ENTMCNC: 33.6 MG/DL (ref 33–36)
MCV RBC AUTO: 88.5 FL (ref 80–94)
MCV RBC AUTO: 89.7 FL (ref 80–94)
MCV RBC AUTO: 90 FL (ref 80–94)
MONOCYTES # BLD AUTO: 0.24 X10(3)/MCL (ref 0.1–1.3)
MONOCYTES # BLD AUTO: 0.39 X10(3)/MCL (ref 0.1–1.3)
MONOCYTES # BLD AUTO: 0.4 X10(3)/MCL (ref 0.1–1.3)
MONOCYTES NFR BLD AUTO: 2.3 %
MONOCYTES NFR BLD AUTO: 4.6 %
MONOCYTES NFR BLD AUTO: 5.1 %
MUCOUS THREADS URNS QL MICRO: ABNORMAL /LPF
NEUTROPHILS # BLD AUTO: 6.1 X10(3)/MCL (ref 2.1–9.2)
NEUTROPHILS # BLD AUTO: 7.5 X10(3)/MCL (ref 2.1–9.2)
NEUTROPHILS # BLD AUTO: 9.4 X10(3)/MCL (ref 2.1–9.2)
NEUTROPHILS NFR BLD AUTO: 80.7 %
NEUTROPHILS NFR BLD AUTO: 85.8 %
NEUTROPHILS NFR BLD AUTO: 89.3 %
NITRITE UR QL STRIP.AUTO: NEGATIVE
NRBC BLD AUTO-RTO: 0 %
NSE SERPL-MCNC: 512 NG/ML
PCO2 BLDA: 33 MMHG (ref 35–45)
PCO2 BLDA: 34 MMHG (ref 35–45)
PH SMN: 7.51 [PH] (ref 7.35–7.45)
PH SMN: 7.54 [PH] (ref 7.35–7.45)
PH UR STRIP.AUTO: 7 [PH]
PHOSPHATE SERPL-MCNC: 1.2 MG/DL (ref 2.3–4.7)
PHOSPHATE SERPL-MCNC: 1.8 MG/DL (ref 2.3–4.7)
PLATELET # BLD AUTO: 158 X10(3)/MCL (ref 130–400)
PLATELET # BLD AUTO: 167 X10(3)/MCL (ref 130–400)
PLATELET # BLD AUTO: 168 X10(3)/MCL (ref 130–400)
PMV BLD AUTO: 11.3 FL (ref 7.4–10.4)
PMV BLD AUTO: 11.7 FL (ref 7.4–10.4)
PMV BLD AUTO: 12.1 FL (ref 7.4–10.4)
PO2 BLDA: 103 MMHG (ref 75–100)
PO2 BLDA: 115 MMHG (ref 75–100)
POC BASE DEFICIT: 3.5 MMOL/L (ref -2–2)
POC BASE DEFICIT: 6.3 MMOL/L (ref -2–2)
POC COHB: 1.2 % (ref 0.5–1.5)
POC COHB: 1.9 % (ref 0.5–1.5)
POC METHB: 0.8 % (ref 0–1.5)
POC METHB: 0.9 % (ref 0–1.5)
POC O2HB: 96.7 % (ref 94–100)
POC O2HB: 96.9 % (ref 94–100)
POC PERFORMED BY: ABNORMAL
POC PERFORMED BY: ABNORMAL
POC SATURATED O2: 98.5 % (ref 90–100)
POC SATURATED O2: 99 % (ref 90–100)
POC TEMPERATURE: 37 °C
POC TEMPERATURE: 37 °C
POCT GLUCOSE: 127 MG/DL (ref 70–110)
POTASSIUM SERPL-SCNC: 2.9 MMOL/L (ref 3.5–5.1)
POTASSIUM SERPL-SCNC: 3.1 MMOL/L (ref 3.5–5.1)
PROT SERPL-MCNC: 5.5 GM/DL (ref 6.4–8.3)
PROT SERPL-MCNC: 5.6 GM/DL (ref 6.4–8.3)
PROT UR QL STRIP.AUTO: NEGATIVE MG/DL
RBC # BLD AUTO: 2.79 X10(6)/MCL (ref 4.2–5.4)
RBC # BLD AUTO: 3.1 X10(6)/MCL (ref 4.2–5.4)
RBC # BLD AUTO: 3.23 X10(6)/MCL (ref 4.2–5.4)
RBC #/AREA URNS AUTO: ABNORMAL /HPF
RBC UR QL AUTO: NEGATIVE UNIT/L
RSV A 5' UTR RNA NPH QL NAA+PROBE: NOT DETECTED
SARS-COV-2 RNA RESP QL NAA+PROBE: NOT DETECTED
SODIUM SERPL-SCNC: 150 MMOL/L (ref 136–145)
SODIUM SERPL-SCNC: 151 MMOL/L (ref 136–145)
SP GR UR STRIP.AUTO: 1.01
SPECIMEN SOURCE: ABNORMAL
SPECIMEN SOURCE: ABNORMAL
SQUAMOUS #/AREA URNS LPF: ABNORMAL /HPF
TROPONIN I SERPL-MCNC: 0.05 NG/ML (ref 0–0.04)
UROBILINOGEN UR STRIP-ACNC: NORMAL MG/DL
VANCOMYCIN TROUGH SERPL-MCNC: 7.6 UG/ML (ref 15–20)
WBC # SPEC AUTO: 10.5 X10(3)/MCL (ref 4.5–11.5)
WBC # SPEC AUTO: 7.6 X10(3)/MCL (ref 4.5–11.5)
WBC # SPEC AUTO: 8.8 X10(3)/MCL (ref 4.5–11.5)
WBC #/AREA URNS AUTO: ABNORMAL /HPF

## 2022-12-10 PROCEDURE — 85025 COMPLETE CBC W/AUTO DIFF WBC: CPT | Performed by: STUDENT IN AN ORGANIZED HEALTH CARE EDUCATION/TRAINING PROGRAM

## 2022-12-10 PROCEDURE — 85610 PROTHROMBIN TIME: CPT | Performed by: STUDENT IN AN ORGANIZED HEALTH CARE EDUCATION/TRAINING PROGRAM

## 2022-12-10 PROCEDURE — 99900035 HC TECH TIME PER 15 MIN (STAT)

## 2022-12-10 PROCEDURE — 94761 N-INVAS EAR/PLS OXIMETRY MLT: CPT

## 2022-12-10 PROCEDURE — 20000000 HC ICU ROOM

## 2022-12-10 PROCEDURE — 63600175 PHARM REV CODE 636 W HCPCS

## 2022-12-10 PROCEDURE — 83690 ASSAY OF LIPASE: CPT | Performed by: STUDENT IN AN ORGANIZED HEALTH CARE EDUCATION/TRAINING PROGRAM

## 2022-12-10 PROCEDURE — 27200966 HC CLOSED SUCTION SYSTEM

## 2022-12-10 PROCEDURE — 86850 RBC ANTIBODY SCREEN: CPT | Performed by: INTERNAL MEDICINE

## 2022-12-10 PROCEDURE — 82803 BLOOD GASES ANY COMBINATION: CPT

## 2022-12-10 PROCEDURE — 83520 IMMUNOASSAY QUANT NOS NONAB: CPT | Performed by: STUDENT IN AN ORGANIZED HEALTH CARE EDUCATION/TRAINING PROGRAM

## 2022-12-10 PROCEDURE — 83615 LACTATE (LD) (LDH) ENZYME: CPT | Performed by: STUDENT IN AN ORGANIZED HEALTH CARE EDUCATION/TRAINING PROGRAM

## 2022-12-10 PROCEDURE — 63600175 PHARM REV CODE 636 W HCPCS: Performed by: STUDENT IN AN ORGANIZED HEALTH CARE EDUCATION/TRAINING PROGRAM

## 2022-12-10 PROCEDURE — 93005 ELECTROCARDIOGRAM TRACING: CPT

## 2022-12-10 PROCEDURE — 84484 ASSAY OF TROPONIN QUANT: CPT | Performed by: STUDENT IN AN ORGANIZED HEALTH CARE EDUCATION/TRAINING PROGRAM

## 2022-12-10 PROCEDURE — 94003 VENT MGMT INPAT SUBQ DAY: CPT

## 2022-12-10 PROCEDURE — 25000003 PHARM REV CODE 250: Performed by: STUDENT IN AN ORGANIZED HEALTH CARE EDUCATION/TRAINING PROGRAM

## 2022-12-10 PROCEDURE — 84100 ASSAY OF PHOSPHORUS: CPT | Performed by: STUDENT IN AN ORGANIZED HEALTH CARE EDUCATION/TRAINING PROGRAM

## 2022-12-10 PROCEDURE — 82977 ASSAY OF GGT: CPT | Performed by: STUDENT IN AN ORGANIZED HEALTH CARE EDUCATION/TRAINING PROGRAM

## 2022-12-10 PROCEDURE — 63600175 PHARM REV CODE 636 W HCPCS: Mod: JG | Performed by: STUDENT IN AN ORGANIZED HEALTH CARE EDUCATION/TRAINING PROGRAM

## 2022-12-10 PROCEDURE — 82553 CREATINE MB FRACTION: CPT | Performed by: STUDENT IN AN ORGANIZED HEALTH CARE EDUCATION/TRAINING PROGRAM

## 2022-12-10 PROCEDURE — 80202 ASSAY OF VANCOMYCIN: CPT | Performed by: STUDENT IN AN ORGANIZED HEALTH CARE EDUCATION/TRAINING PROGRAM

## 2022-12-10 PROCEDURE — 0241U COVID/RSV/FLU A&B PCR: CPT | Performed by: STUDENT IN AN ORGANIZED HEALTH CARE EDUCATION/TRAINING PROGRAM

## 2022-12-10 PROCEDURE — 80053 COMPREHEN METABOLIC PANEL: CPT | Performed by: STUDENT IN AN ORGANIZED HEALTH CARE EDUCATION/TRAINING PROGRAM

## 2022-12-10 PROCEDURE — 99900026 HC AIRWAY MAINTENANCE (STAT)

## 2022-12-10 PROCEDURE — 87040 BLOOD CULTURE FOR BACTERIA: CPT | Performed by: STUDENT IN AN ORGANIZED HEALTH CARE EDUCATION/TRAINING PROGRAM

## 2022-12-10 PROCEDURE — 36415 COLL VENOUS BLD VENIPUNCTURE: CPT | Performed by: STUDENT IN AN ORGANIZED HEALTH CARE EDUCATION/TRAINING PROGRAM

## 2022-12-10 PROCEDURE — 87070 CULTURE OTHR SPECIMN AEROBIC: CPT | Performed by: STUDENT IN AN ORGANIZED HEALTH CARE EDUCATION/TRAINING PROGRAM

## 2022-12-10 PROCEDURE — A4216 STERILE WATER/SALINE, 10 ML: HCPCS | Performed by: STUDENT IN AN ORGANIZED HEALTH CARE EDUCATION/TRAINING PROGRAM

## 2022-12-10 PROCEDURE — 85730 THROMBOPLASTIN TIME PARTIAL: CPT | Performed by: STUDENT IN AN ORGANIZED HEALTH CARE EDUCATION/TRAINING PROGRAM

## 2022-12-10 PROCEDURE — 25000003 PHARM REV CODE 250: Performed by: FAMILY MEDICINE

## 2022-12-10 PROCEDURE — 83735 ASSAY OF MAGNESIUM: CPT | Performed by: STUDENT IN AN ORGANIZED HEALTH CARE EDUCATION/TRAINING PROGRAM

## 2022-12-10 PROCEDURE — 82150 ASSAY OF AMYLASE: CPT | Performed by: STUDENT IN AN ORGANIZED HEALTH CARE EDUCATION/TRAINING PROGRAM

## 2022-12-10 PROCEDURE — 87088 URINE BACTERIA CULTURE: CPT | Performed by: STUDENT IN AN ORGANIZED HEALTH CARE EDUCATION/TRAINING PROGRAM

## 2022-12-10 PROCEDURE — 81001 URINALYSIS AUTO W/SCOPE: CPT | Performed by: STUDENT IN AN ORGANIZED HEALTH CARE EDUCATION/TRAINING PROGRAM

## 2022-12-10 PROCEDURE — 63700000 PHARM REV CODE 250 ALT 637 W/O HCPCS: Performed by: STUDENT IN AN ORGANIZED HEALTH CARE EDUCATION/TRAINING PROGRAM

## 2022-12-10 PROCEDURE — 82248 BILIRUBIN DIRECT: CPT | Performed by: STUDENT IN AN ORGANIZED HEALTH CARE EDUCATION/TRAINING PROGRAM

## 2022-12-10 PROCEDURE — 27000221 HC OXYGEN, UP TO 24 HOURS

## 2022-12-10 PROCEDURE — 36600 WITHDRAWAL OF ARTERIAL BLOOD: CPT | Performed by: STUDENT IN AN ORGANIZED HEALTH CARE EDUCATION/TRAINING PROGRAM

## 2022-12-10 PROCEDURE — 83036 HEMOGLOBIN GLYCOSYLATED A1C: CPT | Performed by: STUDENT IN AN ORGANIZED HEALTH CARE EDUCATION/TRAINING PROGRAM

## 2022-12-10 PROCEDURE — P9045 ALBUMIN (HUMAN), 5%, 250 ML: HCPCS | Mod: JG | Performed by: STUDENT IN AN ORGANIZED HEALTH CARE EDUCATION/TRAINING PROGRAM

## 2022-12-10 RX ORDER — ALBUMIN HUMAN 50 G/1000ML
25 SOLUTION INTRAVENOUS ONCE
Status: COMPLETED | OUTPATIENT
Start: 2022-12-10 | End: 2022-12-10

## 2022-12-10 RX ORDER — FLUCONAZOLE 100 MG/1
100 TABLET ORAL DAILY
Status: DISCONTINUED | OUTPATIENT
Start: 2022-12-10 | End: 2022-12-12 | Stop reason: HOSPADM

## 2022-12-10 RX ORDER — HYDRALAZINE HYDROCHLORIDE 20 MG/ML
10 INJECTION INTRAMUSCULAR; INTRAVENOUS EVERY 4 HOURS PRN
Status: DISCONTINUED | OUTPATIENT
Start: 2022-12-10 | End: 2022-12-12 | Stop reason: HOSPADM

## 2022-12-10 RX ORDER — HYDRALAZINE HYDROCHLORIDE 20 MG/ML
INJECTION INTRAMUSCULAR; INTRAVENOUS
Status: COMPLETED
Start: 2022-12-10 | End: 2022-12-10

## 2022-12-10 RX ADMIN — AMPICILLIN SODIUM AND SULBACTAM SODIUM 1.5 G: 1; .5 INJECTION, POWDER, FOR SOLUTION INTRAMUSCULAR; INTRAVENOUS at 10:12

## 2022-12-10 RX ADMIN — MUPIROCIN: 20 OINTMENT TOPICAL at 08:12

## 2022-12-10 RX ADMIN — SODIUM CHLORIDE, PRESERVATIVE FREE 10 ML: 5 INJECTION INTRAVENOUS at 05:12

## 2022-12-10 RX ADMIN — SODIUM CHLORIDE, PRESERVATIVE FREE 10 ML: 5 INJECTION INTRAVENOUS at 12:12

## 2022-12-10 RX ADMIN — HYDRALAZINE HYDROCHLORIDE 10 MG: 20 INJECTION INTRAMUSCULAR; INTRAVENOUS at 03:12

## 2022-12-10 RX ADMIN — FLUCONAZOLE 100 MG: 100 TABLET ORAL at 08:12

## 2022-12-10 RX ADMIN — POTASSIUM PHOSPHATE, MONOBASIC AND POTASSIUM PHOSPHATE, DIBASIC 30 MMOL: 224; 236 INJECTION, SOLUTION, CONCENTRATE INTRAVENOUS at 07:12

## 2022-12-10 RX ADMIN — ALBUMIN HUMAN 25 G: 50 SOLUTION INTRAVENOUS at 10:12

## 2022-12-10 RX ADMIN — LEVETIRACETAM INJECTION 1000 MG: 5 INJECTION INTRAVENOUS at 08:12

## 2022-12-10 RX ADMIN — AMPICILLIN SODIUM AND SULBACTAM SODIUM 1.5 G: 1; .5 INJECTION, POWDER, FOR SOLUTION INTRAMUSCULAR; INTRAVENOUS at 06:12

## 2022-12-10 RX ADMIN — SODIUM CHLORIDE, PRESERVATIVE FREE 10 ML: 5 INJECTION INTRAVENOUS at 11:12

## 2022-12-10 RX ADMIN — FAMOTIDINE 20 MG: 10 INJECTION, SOLUTION INTRAVENOUS at 08:12

## 2022-12-10 RX ADMIN — NOREPINEPHRINE BITARTRATE 0.05 MCG/KG/MIN: 1 INJECTION, SOLUTION, CONCENTRATE INTRAVENOUS at 10:12

## 2022-12-10 RX ADMIN — ENOXAPARIN SODIUM 40 MG: 40 INJECTION SUBCUTANEOUS at 05:12

## 2022-12-10 NOTE — PROGRESS NOTES
[facility] - ICU  Pulmonary Critical Care Note    Patient Name: Criselda Cannon  MRN: [id number]  Admission Date: 12/7/2022  Hospital Length of Stay: 3 days  Code Status: DNR  Attending Provider: Eliud Peters MD  Primary Care Provider: Primary Doctor No     Subjective:     HPI:   43 yo F with past medical history of anxiety/depression and Bipolar I disorder per chart review who was brought in by neighbor in cardiac arrest. History obtained from ED provider who spoke with individual prior to his departure from ED. Patient has been living in a tent at a local campground and individual has been allowing patient to stay in his van when the weather is bad. The individual left for approximately 20 minutes and upon his return the patient was found unresponsive and not breathing. He began CPR which was performed intermittently until their arrival to ED. Drive from [place] to ED took approximately 20-30 minutes. Patient with history of drug abuse.     24 Hour Interval History:  Patient lost respiratory drive overnight, no longer breathing over vent.  Respiratory rate decreased to 16 and she had no additional breaths.  Other neuro status remains the same.  Remains hemodynamically stable, not requiring any vasopressor support.  Made 2.7 L of urine over last 24 hours, originally bloody but now more clear.    Past Medical History:   Diagnosis Date    Anxiety     Bipolar 1 disorder     Depression      History reviewed. No pertinent surgical history.    Social History     Socioeconomic History    Marital status: Single   Tobacco Use    Smoking status: Every Day     Packs/day: 1.00     Types: Cigarettes   Substance and Sexual Activity    Alcohol use: No    Drug use: Yes    Sexual activity: Yes     Social Determinants of Health     Financial Resource Strain: Low Risk     Difficulty of Paying Living Expenses: Not hard at all   Food Insecurity: No Food Insecurity    Worried About Running Out of Food in the Last Year:  Never true    Ran Out of Food in the Last Year: Never true   Transportation Needs: No Transportation Needs    Lack of Transportation (Medical): No    Lack of Transportation (Non-Medical): No   Social Connections: Moderately Isolated    Frequency of Communication with Friends and Family: More than three times a week    Frequency of Social Gatherings with Friends and Family: More than three times a week    Attends Temple Services: Never    Active Member of Clubs or Organizations: No    Attends Club or Organization Meetings: Never    Marital Status:    Housing Stability: High Risk    Unable to Pay for Housing in the Last Year: No    Number of Places Lived in the Last Year: 4    Unstable Housing in the Last Year: No     Current Outpatient Medications   Medication Instructions    ARIPiprazole (ABILIFY) 5 mg, Daily    clonazePAM (KLONOPIN) 1 mg, 2 times daily PRN    hydrOXYzine HCL (ATARAX) 25 mg, Oral, Every 6 hours    MEDROXYPROGESTERONE ACETATE (DEPO-PROVERA IM) Inject into the muscle.    sertraline (ZOLOFT) 100 mg, Daily    traZODone (DESYREL) 100 mg, Nightly   Current Inpatient Medications   enoxaparin  40 mg Subcutaneous Daily    famotidine (PF)  20 mg Intravenous BID    levetiracetam IV  1,000 mg Intravenous Q12H    mupirocin   Nasal BID    perflutren protein-a microsphr  2 mL Intravenous Once    sodium chloride 0.9%  10 mL Intravenous Q6H   Current Intravenous Infusions   dextrose 5 % 75 mL/hr at 12/10/22 0638    NORepinephrine bitartrate-D5W 0 mcg/kg/min (12/07/22 0807)    propofoL Stopped (12/09/22 0232)     Review of Systems   Unable to perform ROS: Intubated      Objective:       Intake/Output Summary (Last 24 hours) at 12/10/2022 1232  Last data filed at 12/10/2022 1104  Gross per 24 hour   Intake 3032.5 ml   Output 4735 ml   Net -1702.5 ml     Vital Signs (Most Recent):  Temp: 99.5 °F (37.5 °C) (12/10/22 0815)  Pulse: 93 (12/10/22 1100)  Resp: (!) 26 (12/10/22 1100)  BP: 138/85 (12/10/22  0800)  SpO2: 97 % (12/10/22 1100)    Body mass index is 25.32 kg/m².  Weight: 62.8 kg (138 lb 7.2 oz) Vital Signs (24h Range):  Temp:  [97.6 °F (36.4 °C)-101.7 °F (38.7 °C)] 99.5 °F (37.5 °C)  Pulse:  [] 93  Resp:  [23-26] 26  SpO2:  [95 %-100 %] 97 %  BP: (132-198)/() 138/85  Arterial Line BP: (127-197)/() 127/65     Physical Exam  Constitutional:       Interventions: She is sedated and intubated.   HENT:      Head: Normocephalic and atraumatic.   Eyes:      Comments: Pupils fixed, dilated.  Bruising around right eye   Cardiovascular:      Rate and Rhythm: Normal rate and regular rhythm.      Pulses: Normal pulses.      Heart sounds: Normal heart sounds.   Pulmonary:      Effort: Pulmonary effort is normal. She is intubated.      Breath sounds: Rales present.   Abdominal:      General: Abdomen is flat. There is no distension.   Neurological:      Comments: Patient with no corneal reflex, no gag reflex, no cough reflex, no withdrawal to painful stimulus, no other brainstem activity.     Lines/Drains/Airways       Peripherally Inserted Central Catheter Line  Duration             PICC Triple Lumen 12/07/22 1154 left basilic 3 days              Drain  Duration                  NG/OG Tube 12/07/22 0206 [place] sump 16 Fr. Right nostril 3 days         Urethral Catheter 12/07/22 0220 Double-lumen 16 Fr. 3 days              Airway  Duration                  Airway - Non-Surgical 12/07/22 0206 3 days              Arterial Line  Duration             Arterial Line 12/07/22 1621 Right Radial 2 days              Peripheral Intravenous Line  Duration                  Peripheral IV - Single Lumen 12/07/22 0416 18 G Distal;Left;Posterior Forearm 3 days         Peripheral IV - Single Lumen 12/07/22 0640 20 G Right Antecubital 3 days                Significant Labs:  Lab Results   Component Value Date    WBC 10.5 12/10/2022    HGB 9.6 (L) 12/10/2022    HCT 28.6 (L) 12/10/2022    MCV 88.5 12/10/2022      12/10/2022   BMP  Lab Results   Component Value Date     (H) 12/10/2022    K 3.1 (L) 12/10/2022     07/06/2013    CO2 24 12/10/2022    BUN 7.6 12/10/2022    CREATININE 0.58 12/10/2022    CALCIUM 8.8 12/10/2022    ANIONGAP 14 07/06/2013    ESTGFRAFRICA >60 07/06/2013    EGFRNONAA >60 07/06/2013   ABG  Recent Labs   Lab 12/10/22  0523   PH 7.51*   PO2 103*   PCO2 33*   HCO3 26.3*   Mechanical Ventilation Support:  Vent Mode: A/C (12/10/22 1200)  Ventilator Initiated: Yes (12/07/22 0206)  Set Rate: 26 BPM (12/10/22 1200)  Vt Set: 400 mL (12/10/22 1200)  PEEP/CPAP: 5 cmH20 (12/10/22 1200)  Oxygen Concentration (%): 40 (12/10/22 0523)  Peak Airway Pressure: 25.9 cmH20 (12/10/22 1200)  Plateau Pressure: 16 cmH20 (12/10/22 0900)  Total Ve: 10 L/m (12/10/22 1200)  F/VT Ratio<105 (RSBI): (!) 67.18 (12/10/22 0900)  Significant Imaging:  I have reviewed the pertinent imaging within the past 24 hours.  Assessment/Plan:     Assessment  Out-of-hospital cardiac arrest with ROSC achieved after unknown amount of time   Polysubstance abuse with acute intoxication   Acute hypoxemic respiratory failure   Anoxic brain injury  Possible aspiration pneumonia      Plan  Patient no longer with respiratory drive, clinically brain dead at this time. Patient's  came to hospital this afternoon to make decision about further goals of care.  CAT present at bedside.  Decision made to proceed with organ donation.  Further brain death studies will have to wait until Monday morning at this time because of hospital capabilities.  Until that time, continuing mechanical ventilation, blood pressure control to keep systolic BP greater than 100 and less than 160.  Continuing other supportive care, CAT to place other orders as needed       Dae Thomas, DO  Pulmonary Critical Care Medicine  [facility] - ICU

## 2022-12-10 NOTE — PROGRESS NOTES
Attempted to reach out to patient's  Kiet ([phone]) x [phone]. Patient's  did not answer and it went to a full voicemail box. Will attempt to reach him again in the morning to give further updates and figure out plans for goals of care going forward.     Dae Thomas, DO  [employer] IM PGY2

## 2022-12-11 LAB
ALBUMIN SERPL-MCNC: 2.3 GM/DL (ref 3.5–5)
ALBUMIN SERPL-MCNC: 2.5 GM/DL (ref 3.5–5)
ALBUMIN SERPL-MCNC: 2.6 GM/DL (ref 3.5–5)
ALBUMIN SERPL-MCNC: 2.7 GM/DL (ref 3.5–5)
ALBUMIN SERPL-MCNC: 2.7 GM/DL (ref 3.5–5)
ALBUMIN/GLOB SERPL: 0.8 RATIO (ref 1.1–2)
ALBUMIN/GLOB SERPL: 0.8 RATIO (ref 1.1–2)
ALBUMIN/GLOB SERPL: 0.9 RATIO (ref 1.1–2)
ALP SERPL-CCNC: 72 UNIT/L (ref 40–150)
ALP SERPL-CCNC: 75 UNIT/L (ref 40–150)
ALP SERPL-CCNC: 77 UNIT/L (ref 40–150)
ALP SERPL-CCNC: 79 UNIT/L (ref 40–150)
ALP SERPL-CCNC: 82 UNIT/L (ref 40–150)
ALT SERPL-CCNC: 50 UNIT/L (ref 0–55)
ALT SERPL-CCNC: 54 UNIT/L (ref 0–55)
ALT SERPL-CCNC: 55 UNIT/L (ref 0–55)
ALT SERPL-CCNC: 56 UNIT/L (ref 0–55)
ALT SERPL-CCNC: 58 UNIT/L (ref 0–55)
AMYLASE SERPL-CCNC: 62 UNIT/L (ref 25–125)
AMYLASE SERPL-CCNC: 67 UNIT/L (ref 25–125)
AMYLASE SERPL-CCNC: 69 UNIT/L (ref 25–125)
AMYLASE SERPL-CCNC: 70 UNIT/L (ref 25–125)
AMYLASE SERPL-CCNC: 71 UNIT/L (ref 25–125)
APPEARANCE UR: CLEAR
APTT PPP: 34 SECONDS
APTT PPP: 38.8 SECONDS
APTT PPP: 40.3 SECONDS
APTT PPP: 41 SECONDS
APTT PPP: 42.1 SECONDS
AST SERPL-CCNC: 43 UNIT/L (ref 5–34)
AST SERPL-CCNC: 43 UNIT/L (ref 5–34)
AST SERPL-CCNC: 51 UNIT/L (ref 5–34)
AST SERPL-CCNC: 60 UNIT/L (ref 5–34)
AST SERPL-CCNC: 68 UNIT/L (ref 5–34)
BACTERIA #/AREA URNS AUTO: ABNORMAL /HPF
BASOPHILS # BLD AUTO: 0.01 X10(3)/MCL (ref 0–0.2)
BASOPHILS # BLD AUTO: 0.02 X10(3)/MCL (ref 0–0.2)
BASOPHILS # BLD AUTO: 0.02 X10(3)/MCL (ref 0–0.2)
BASOPHILS # BLD AUTO: 0.03 X10(3)/MCL (ref 0–0.2)
BASOPHILS NFR BLD AUTO: 0.2 %
BASOPHILS NFR BLD AUTO: 0.3 %
BASOPHILS NFR BLD AUTO: 0.4 %
BASOPHILS NFR BLD AUTO: 0.4 %
BILIRUB UR QL STRIP.AUTO: NEGATIVE MG/DL
BILIRUBIN DIRECT+TOT PNL SERPL-MCNC: 0.2 MG/DL (ref 0–0.5)
BILIRUBIN DIRECT+TOT PNL SERPL-MCNC: 0.3 MG/DL (ref 0–0.5)
BILIRUBIN DIRECT+TOT PNL SERPL-MCNC: 0.5 MG/DL
BILIRUBIN DIRECT+TOT PNL SERPL-MCNC: 0.5 MG/DL
BILIRUBIN DIRECT+TOT PNL SERPL-MCNC: 0.6 MG/DL
BILIRUBIN DIRECT+TOT PNL SERPL-MCNC: 0.6 MG/DL
BILIRUBIN DIRECT+TOT PNL SERPL-MCNC: 0.7 MG/DL
BUN SERPL-MCNC: 6.6 MG/DL (ref 7–18.7)
BUN SERPL-MCNC: 6.6 MG/DL (ref 7–18.7)
BUN SERPL-MCNC: 7 MG/DL (ref 7–18.7)
BUN SERPL-MCNC: 7.3 MG/DL (ref 7–18.7)
BUN SERPL-MCNC: 7.7 MG/DL (ref 7–18.7)
CALCIUM SERPL-MCNC: 8.3 MG/DL (ref 8.4–10.2)
CALCIUM SERPL-MCNC: 8.3 MG/DL (ref 8.4–10.2)
CALCIUM SERPL-MCNC: 8.4 MG/DL (ref 8.4–10.2)
CALCIUM SERPL-MCNC: 8.8 MG/DL (ref 8.4–10.2)
CALCIUM SERPL-MCNC: 9.1 MG/DL (ref 8.4–10.2)
CASTS URNS MICRO: ABNORMAL /LPF
CHLORIDE SERPL-SCNC: 114 MMOL/L (ref 98–107)
CHLORIDE SERPL-SCNC: 117 MMOL/L (ref 98–107)
CHLORIDE SERPL-SCNC: 121 MMOL/L (ref 98–107)
CHLORIDE SERPL-SCNC: 122 MMOL/L (ref 98–107)
CHLORIDE SERPL-SCNC: 127 MMOL/L (ref 98–107)
CK MB SERPL-MCNC: 0.8 NG/ML
CK MB SERPL-MCNC: 3 NG/ML
CK MB SERPL-MCNC: 3.2 NG/ML
CK MB SERPL-MCNC: 3.5 NG/ML
CK MB SERPL-MCNC: 3.9 NG/ML
CO2 SERPL-SCNC: 22 MMOL/L (ref 22–29)
CO2 SERPL-SCNC: 23 MMOL/L (ref 22–29)
CO2 SERPL-SCNC: 23 MMOL/L (ref 22–29)
CO2 SERPL-SCNC: 24 MMOL/L (ref 22–29)
CO2 SERPL-SCNC: 24 MMOL/L (ref 22–29)
COLOR UR AUTO: ABNORMAL
CORRECTED TEMPERATURE (PCO2): 30 MMHG (ref 35–45)
CORRECTED TEMPERATURE (PCO2): 31 MMHG (ref 35–45)
CORRECTED TEMPERATURE (PCO2): 45 MMHG (ref 35–45)
CORRECTED TEMPERATURE (PH): 7.38 (ref 7.35–7.45)
CORRECTED TEMPERATURE (PH): 7.53 (ref 7.35–7.45)
CORRECTED TEMPERATURE (PH): 7.57 (ref 7.35–7.45)
CORRECTED TEMPERATURE (PO2): 109 MMHG (ref 75–100)
CORRECTED TEMPERATURE (PO2): 172 MMHG (ref 75–100)
CORRECTED TEMPERATURE (PO2): 97 MMHG (ref 75–100)
CREAT SERPL-MCNC: 0.56 MG/DL (ref 0.55–1.02)
CREAT SERPL-MCNC: 0.57 MG/DL (ref 0.55–1.02)
CREAT SERPL-MCNC: 0.63 MG/DL (ref 0.55–1.02)
CREAT SERPL-MCNC: 0.72 MG/DL (ref 0.55–1.02)
CREAT SERPL-MCNC: 0.73 MG/DL (ref 0.55–1.02)
EOSINOPHIL # BLD AUTO: 0.01 X10(3)/MCL (ref 0–0.9)
EOSINOPHIL # BLD AUTO: 0.04 X10(3)/MCL (ref 0–0.9)
EOSINOPHIL # BLD AUTO: 0.05 X10(3)/MCL (ref 0–0.9)
EOSINOPHIL # BLD AUTO: 0.22 X10(3)/MCL (ref 0–0.9)
EOSINOPHIL NFR BLD AUTO: 0.2 %
EOSINOPHIL NFR BLD AUTO: 0.6 %
EOSINOPHIL NFR BLD AUTO: 0.9 %
EOSINOPHIL NFR BLD AUTO: 2.9 %
ERYTHROCYTE [DISTWIDTH] IN BLOOD BY AUTOMATED COUNT: 13.4 % (ref 11.5–17)
ERYTHROCYTE [DISTWIDTH] IN BLOOD BY AUTOMATED COUNT: 13.5 % (ref 11.5–17)
ERYTHROCYTE [DISTWIDTH] IN BLOOD BY AUTOMATED COUNT: 13.6 % (ref 11.5–17)
ERYTHROCYTE [DISTWIDTH] IN BLOOD BY AUTOMATED COUNT: 13.6 % (ref 11.5–17)
GFR SERPLBLD CREATININE-BSD FMLA CKD-EPI: >60 MLS/MIN/1.73/M2
GGT SERPL-CCNC: 75 U/L (ref 9–36)
GGT SERPL-CCNC: 80 U/L (ref 9–36)
GGT SERPL-CCNC: 82 U/L (ref 9–36)
GGT SERPL-CCNC: 84 U/L (ref 9–36)
GGT SERPL-CCNC: 92 U/L (ref 9–36)
GLOBULIN SER-MCNC: 2.8 GM/DL (ref 2.4–3.5)
GLOBULIN SER-MCNC: 2.9 GM/DL (ref 2.4–3.5)
GLOBULIN SER-MCNC: 3 GM/DL (ref 2.4–3.5)
GLOBULIN SER-MCNC: 3.1 GM/DL (ref 2.4–3.5)
GLOBULIN SER-MCNC: 3.2 GM/DL (ref 2.4–3.5)
GLUCOSE SERPL-MCNC: 114 MG/DL (ref 74–100)
GLUCOSE SERPL-MCNC: 126 MG/DL (ref 74–100)
GLUCOSE SERPL-MCNC: 138 MG/DL (ref 74–100)
GLUCOSE SERPL-MCNC: 64 MG/DL (ref 74–100)
GLUCOSE SERPL-MCNC: 99 MG/DL (ref 74–100)
GLUCOSE UR QL STRIP.AUTO: NORMAL MG/DL
HCO3 UR-SCNC: 25.1 MMOL/L (ref 22–26)
HCO3 UR-SCNC: 26.6 MMOL/L (ref 22–26)
HCO3 UR-SCNC: 28.4 MMOL/L (ref 22–26)
HCT VFR BLD AUTO: 24.5 % (ref 37–47)
HCT VFR BLD AUTO: 25.2 % (ref 37–47)
HCT VFR BLD AUTO: 26.3 % (ref 37–47)
HCT VFR BLD AUTO: 28 % (ref 37–47)
HGB BLD-MCNC: 8.1 GM/DL (ref 12–16)
HGB BLD-MCNC: 8.4 GM/DL (ref 12–16)
HGB BLD-MCNC: 8.6 GM/DL (ref 12–16)
HGB BLD-MCNC: 8.7 G/DL (ref 12–18)
HGB BLD-MCNC: 9 G/DL (ref 12–18)
HGB BLD-MCNC: 9.2 GM/DL (ref 12–16)
HGB BLD-MCNC: 9.6 G/DL (ref 12–18)
HYALINE CASTS #/AREA URNS LPF: ABNORMAL /LPF
IMM GRANULOCYTES # BLD AUTO: 0.07 X10(3)/MCL (ref 0–0.04)
IMM GRANULOCYTES # BLD AUTO: 0.07 X10(3)/MCL (ref 0–0.04)
IMM GRANULOCYTES # BLD AUTO: 0.1 X10(3)/MCL (ref 0–0.04)
IMM GRANULOCYTES # BLD AUTO: 0.14 X10(3)/MCL (ref 0–0.04)
IMM GRANULOCYTES NFR BLD AUTO: 1.1 %
IMM GRANULOCYTES NFR BLD AUTO: 1.3 %
IMM GRANULOCYTES NFR BLD AUTO: 1.8 %
IMM GRANULOCYTES NFR BLD AUTO: 1.9 %
KETONES UR QL STRIP.AUTO: NEGATIVE MG/DL
LDH SERPL-CCNC: 374 U/L (ref 125–220)
LDH SERPL-CCNC: 396 U/L (ref 125–220)
LDH SERPL-CCNC: 410 U/L (ref 125–220)
LDH SERPL-CCNC: 411 U/L (ref 125–220)
LDH SERPL-CCNC: 412 U/L (ref 125–220)
LEUKOCYTE ESTERASE UR QL STRIP.AUTO: 25 UNIT/L
LIPASE SERPL-CCNC: 50 U/L
LIPASE SERPL-CCNC: 52 U/L
LIPASE SERPL-CCNC: 54 U/L
LIPASE SERPL-CCNC: 55 U/L
LIPASE SERPL-CCNC: 59 U/L
LYMPHOCYTES # BLD AUTO: 1.32 X10(3)/MCL (ref 0.6–4.6)
LYMPHOCYTES # BLD AUTO: 1.4 X10(3)/MCL (ref 0.6–4.6)
LYMPHOCYTES # BLD AUTO: 1.5 X10(3)/MCL (ref 0.6–4.6)
LYMPHOCYTES # BLD AUTO: 2 X10(3)/MCL (ref 0.6–4.6)
LYMPHOCYTES NFR BLD AUTO: 21 %
LYMPHOCYTES NFR BLD AUTO: 23.9 %
LYMPHOCYTES NFR BLD AUTO: 26.5 %
LYMPHOCYTES NFR BLD AUTO: 26.8 %
MAGNESIUM SERPL-MCNC: 1.7 MG/DL (ref 1.6–2.6)
MAGNESIUM SERPL-MCNC: 1.8 MG/DL (ref 1.6–2.6)
MAGNESIUM SERPL-MCNC: 2 MG/DL (ref 1.6–2.6)
MAGNESIUM SERPL-MCNC: 2.1 MG/DL (ref 1.6–2.6)
MAGNESIUM SERPL-MCNC: 2.3 MG/DL (ref 1.6–2.6)
MCH RBC QN AUTO: 29.7 PG (ref 27–31)
MCH RBC QN AUTO: 29.8 PG (ref 27–31)
MCH RBC QN AUTO: 29.9 PG (ref 27–31)
MCH RBC QN AUTO: 30 PG (ref 27–31)
MCHC RBC AUTO-ENTMCNC: 32.7 MG/DL (ref 33–36)
MCHC RBC AUTO-ENTMCNC: 32.9 MG/DL (ref 33–36)
MCHC RBC AUTO-ENTMCNC: 33.1 MG/DL (ref 33–36)
MCHC RBC AUTO-ENTMCNC: 33.3 MG/DL (ref 33–36)
MCV RBC AUTO: 89.7 FL (ref 80–94)
MCV RBC AUTO: 90.1 FL (ref 80–94)
MCV RBC AUTO: 90.7 FL (ref 80–94)
MCV RBC AUTO: 91.2 FL (ref 80–94)
MONOCYTES # BLD AUTO: 0.3 X10(3)/MCL (ref 0.1–1.3)
MONOCYTES # BLD AUTO: 0.46 X10(3)/MCL (ref 0.1–1.3)
MONOCYTES # BLD AUTO: 0.48 X10(3)/MCL (ref 0.1–1.3)
MONOCYTES # BLD AUTO: 0.64 X10(3)/MCL (ref 0.1–1.3)
MONOCYTES NFR BLD AUTO: 5.4 %
MONOCYTES NFR BLD AUTO: 6.9 %
MONOCYTES NFR BLD AUTO: 8.5 %
MONOCYTES NFR BLD AUTO: 8.7 %
MUCOUS THREADS URNS QL MICRO: ABNORMAL /LPF
NEUTROPHILS # BLD AUTO: 3.6 X10(3)/MCL (ref 2.1–9.2)
NEUTROPHILS # BLD AUTO: 3.7 X10(3)/MCL (ref 2.1–9.2)
NEUTROPHILS # BLD AUTO: 4.5 X10(3)/MCL (ref 2.1–9.2)
NEUTROPHILS # BLD AUTO: 4.7 X10(3)/MCL (ref 2.1–9.2)
NEUTROPHILS NFR BLD AUTO: 59.8 %
NEUTROPHILS NFR BLD AUTO: 64.5 %
NEUTROPHILS NFR BLD AUTO: 65.9 %
NEUTROPHILS NFR BLD AUTO: 70.1 %
NITRITE UR QL STRIP.AUTO: NEGATIVE
NRBC BLD AUTO-RTO: 0 %
PCO2 BLDA: 30 MMHG (ref 35–45)
PCO2 BLDA: 31 MMHG (ref 35–45)
PCO2 BLDA: 45 MMHG (ref 35–45)
PEEP: 5 CM H2O
PH SMN: 7.38 [PH] (ref 7.35–7.45)
PH SMN: 7.53 [PH] (ref 7.35–7.45)
PH SMN: 7.57 [PH] (ref 7.35–7.45)
PH UR STRIP.AUTO: 6 [PH]
PHOSPHATE SERPL-MCNC: 1.4 MG/DL (ref 2.3–4.7)
PHOSPHATE SERPL-MCNC: 3 MG/DL (ref 2.3–4.7)
PHOSPHATE SERPL-MCNC: 3 MG/DL (ref 2.3–4.7)
PHOSPHATE SERPL-MCNC: 3.4 MG/DL (ref 2.3–4.7)
PHOSPHATE SERPL-MCNC: 3.5 MG/DL (ref 2.3–4.7)
PLATELET # BLD AUTO: 136 X10(3)/MCL (ref 130–400)
PLATELET # BLD AUTO: 144 X10(3)/MCL (ref 130–400)
PLATELET # BLD AUTO: 150 X10(3)/MCL (ref 130–400)
PLATELET # BLD AUTO: 159 X10(3)/MCL (ref 130–400)
PLATELETS.RETICULATED NFR BLD AUTO: 5 % (ref 0.9–11.2)
PMV BLD AUTO: 10.8 FL (ref 7.4–10.4)
PMV BLD AUTO: 10.9 FL (ref 7.4–10.4)
PMV BLD AUTO: 11.2 FL (ref 7.4–10.4)
PMV BLD AUTO: 11.4 FL (ref 7.4–10.4)
PO2 BLDA: 109 MMHG (ref 75–100)
PO2 BLDA: 172 MMHG (ref 75–100)
PO2 BLDA: 97 MMHG (ref 75–100)
POC BASE DEFICIT: 1.2 MMOL/L (ref -2–2)
POC BASE DEFICIT: 2.6 MMOL/L (ref -2–2)
POC BASE DEFICIT: 6.2 MMOL/L (ref -2–2)
POC COHB: 1.1 % (ref 0.5–1.5)
POC COHB: 1.4 % (ref 0.5–1.5)
POC COHB: 1.8 % (ref 0.5–1.5)
POC METHB: 0.9 % (ref 0–1.5)
POC METHB: 0.9 % (ref 0–1.5)
POC METHB: 1 % (ref 0–1.5)
POC O2HB: 96.5 % (ref 94–100)
POC O2HB: 96.6 % (ref 94–100)
POC O2HB: 97.5 % (ref 94–100)
POC PERFORMED BY: ABNORMAL
POC SATURATED O2: 98.2 % (ref 90–100)
POC SATURATED O2: 98.4 % (ref 90–100)
POC SATURATED O2: 99.7 % (ref 90–100)
POC TEMPERATURE: 37 °C
POCT GLUCOSE: 126 MG/DL (ref 70–110)
POCT GLUCOSE: 75 MG/DL (ref 70–110)
POTASSIUM SERPL-SCNC: 2.6 MMOL/L (ref 3.5–5.1)
POTASSIUM SERPL-SCNC: 2.8 MMOL/L (ref 3.5–5.1)
POTASSIUM SERPL-SCNC: 2.9 MMOL/L (ref 3.5–5.1)
POTASSIUM SERPL-SCNC: 3.2 MMOL/L (ref 3.5–5.1)
POTASSIUM SERPL-SCNC: 3.5 MMOL/L (ref 3.5–5.1)
PROT SERPL-MCNC: 5.1 GM/DL (ref 6.4–8.3)
PROT SERPL-MCNC: 5.4 GM/DL (ref 6.4–8.3)
PROT SERPL-MCNC: 5.7 GM/DL (ref 6.4–8.3)
PROT SERPL-MCNC: 5.8 GM/DL (ref 6.4–8.3)
PROT SERPL-MCNC: 5.8 GM/DL (ref 6.4–8.3)
PROT UR QL STRIP.AUTO: ABNORMAL MG/DL
RBC # BLD AUTO: 2.72 X10(6)/MCL (ref 4.2–5.4)
RBC # BLD AUTO: 2.81 X10(6)/MCL (ref 4.2–5.4)
RBC # BLD AUTO: 2.9 X10(6)/MCL (ref 4.2–5.4)
RBC # BLD AUTO: 3.07 X10(6)/MCL (ref 4.2–5.4)
RBC #/AREA URNS AUTO: ABNORMAL /HPF
RBC UR QL AUTO: ABNORMAL UNIT/L
SODIUM SERPL-SCNC: 147 MMOL/L (ref 136–145)
SODIUM SERPL-SCNC: 148 MMOL/L (ref 136–145)
SODIUM SERPL-SCNC: 154 MMOL/L (ref 136–145)
SODIUM SERPL-SCNC: 156 MMOL/L (ref 136–145)
SODIUM SERPL-SCNC: 160 MMOL/L (ref 136–145)
SP GR UR STRIP.AUTO: 1.01
SPECIMEN SOURCE: ABNORMAL
SQUAMOUS #/AREA URNS LPF: ABNORMAL /HPF
TROPONIN I SERPL-MCNC: 0.03 NG/ML (ref 0–0.04)
TROPONIN I SERPL-MCNC: 0.03 NG/ML (ref 0–0.04)
TROPONIN I SERPL-MCNC: 0.04 NG/ML (ref 0–0.04)
TROPONIN I SERPL-MCNC: 0.05 NG/ML (ref 0–0.04)
UNIDENT CRYS #/AREA URNS HPF: ABNORMAL /HPF
UROBILINOGEN UR STRIP-ACNC: NORMAL MG/DL
WBC # SPEC AUTO: 5.5 X10(3)/MCL (ref 4.5–11.5)
WBC # SPEC AUTO: 5.6 X10(3)/MCL (ref 4.5–11.5)
WBC # SPEC AUTO: 6.7 X10(3)/MCL (ref 4.5–11.5)
WBC # SPEC AUTO: 7.6 X10(3)/MCL (ref 4.5–11.5)
WBC #/AREA URNS AUTO: ABNORMAL /HPF

## 2022-12-11 PROCEDURE — 83690 ASSAY OF LIPASE: CPT | Performed by: STUDENT IN AN ORGANIZED HEALTH CARE EDUCATION/TRAINING PROGRAM

## 2022-12-11 PROCEDURE — 82248 BILIRUBIN DIRECT: CPT | Performed by: STUDENT IN AN ORGANIZED HEALTH CARE EDUCATION/TRAINING PROGRAM

## 2022-12-11 PROCEDURE — 63600175 PHARM REV CODE 636 W HCPCS: Performed by: STUDENT IN AN ORGANIZED HEALTH CARE EDUCATION/TRAINING PROGRAM

## 2022-12-11 PROCEDURE — 20000000 HC ICU ROOM

## 2022-12-11 PROCEDURE — 94760 N-INVAS EAR/PLS OXIMETRY 1: CPT

## 2022-12-11 PROCEDURE — 63700000 PHARM REV CODE 250 ALT 637 W/O HCPCS: Performed by: STUDENT IN AN ORGANIZED HEALTH CARE EDUCATION/TRAINING PROGRAM

## 2022-12-11 PROCEDURE — 82150 ASSAY OF AMYLASE: CPT | Performed by: STUDENT IN AN ORGANIZED HEALTH CARE EDUCATION/TRAINING PROGRAM

## 2022-12-11 PROCEDURE — 27000221 HC OXYGEN, UP TO 24 HOURS

## 2022-12-11 PROCEDURE — 25000003 PHARM REV CODE 250: Performed by: INTERNAL MEDICINE

## 2022-12-11 PROCEDURE — 27200966 HC CLOSED SUCTION SYSTEM

## 2022-12-11 PROCEDURE — 94003 VENT MGMT INPAT SUBQ DAY: CPT

## 2022-12-11 PROCEDURE — 84484 ASSAY OF TROPONIN QUANT: CPT | Performed by: STUDENT IN AN ORGANIZED HEALTH CARE EDUCATION/TRAINING PROGRAM

## 2022-12-11 PROCEDURE — 81001 URINALYSIS AUTO W/SCOPE: CPT | Performed by: STUDENT IN AN ORGANIZED HEALTH CARE EDUCATION/TRAINING PROGRAM

## 2022-12-11 PROCEDURE — 82553 CREATINE MB FRACTION: CPT | Performed by: STUDENT IN AN ORGANIZED HEALTH CARE EDUCATION/TRAINING PROGRAM

## 2022-12-11 PROCEDURE — A4216 STERILE WATER/SALINE, 10 ML: HCPCS | Performed by: STUDENT IN AN ORGANIZED HEALTH CARE EDUCATION/TRAINING PROGRAM

## 2022-12-11 PROCEDURE — 36415 COLL VENOUS BLD VENIPUNCTURE: CPT | Performed by: STUDENT IN AN ORGANIZED HEALTH CARE EDUCATION/TRAINING PROGRAM

## 2022-12-11 PROCEDURE — 85025 COMPLETE CBC W/AUTO DIFF WBC: CPT | Performed by: STUDENT IN AN ORGANIZED HEALTH CARE EDUCATION/TRAINING PROGRAM

## 2022-12-11 PROCEDURE — 83615 LACTATE (LD) (LDH) ENZYME: CPT | Performed by: STUDENT IN AN ORGANIZED HEALTH CARE EDUCATION/TRAINING PROGRAM

## 2022-12-11 PROCEDURE — 82977 ASSAY OF GGT: CPT | Performed by: STUDENT IN AN ORGANIZED HEALTH CARE EDUCATION/TRAINING PROGRAM

## 2022-12-11 PROCEDURE — 85730 THROMBOPLASTIN TIME PARTIAL: CPT | Performed by: STUDENT IN AN ORGANIZED HEALTH CARE EDUCATION/TRAINING PROGRAM

## 2022-12-11 PROCEDURE — 82803 BLOOD GASES ANY COMBINATION: CPT

## 2022-12-11 PROCEDURE — 84100 ASSAY OF PHOSPHORUS: CPT | Performed by: STUDENT IN AN ORGANIZED HEALTH CARE EDUCATION/TRAINING PROGRAM

## 2022-12-11 PROCEDURE — 83735 ASSAY OF MAGNESIUM: CPT | Performed by: STUDENT IN AN ORGANIZED HEALTH CARE EDUCATION/TRAINING PROGRAM

## 2022-12-11 PROCEDURE — 25000003 PHARM REV CODE 250: Performed by: STUDENT IN AN ORGANIZED HEALTH CARE EDUCATION/TRAINING PROGRAM

## 2022-12-11 PROCEDURE — 99900035 HC TECH TIME PER 15 MIN (STAT)

## 2022-12-11 PROCEDURE — 80053 COMPREHEN METABOLIC PANEL: CPT | Performed by: STUDENT IN AN ORGANIZED HEALTH CARE EDUCATION/TRAINING PROGRAM

## 2022-12-11 PROCEDURE — 37799 UNLISTED PX VASCULAR SURGERY: CPT

## 2022-12-11 PROCEDURE — 36600 WITHDRAWAL OF ARTERIAL BLOOD: CPT

## 2022-12-11 PROCEDURE — 85610 PROTHROMBIN TIME: CPT | Performed by: STUDENT IN AN ORGANIZED HEALTH CARE EDUCATION/TRAINING PROGRAM

## 2022-12-11 PROCEDURE — 94761 N-INVAS EAR/PLS OXIMETRY MLT: CPT

## 2022-12-11 RX ORDER — POTASSIUM CHLORIDE 7.45 MG/ML
10 INJECTION INTRAVENOUS
Status: COMPLETED | OUTPATIENT
Start: 2022-12-11 | End: 2022-12-12

## 2022-12-11 RX ORDER — POTASSIUM CHLORIDE 7.45 MG/ML
10 INJECTION INTRAVENOUS
Status: COMPLETED | OUTPATIENT
Start: 2022-12-11 | End: 2022-12-11

## 2022-12-11 RX ADMIN — AMPICILLIN SODIUM AND SULBACTAM SODIUM 1.5 G: 1; .5 INJECTION, POWDER, FOR SOLUTION INTRAMUSCULAR; INTRAVENOUS at 10:12

## 2022-12-11 RX ADMIN — POTASSIUM CHLORIDE 10 MEQ: 7.46 INJECTION, SOLUTION INTRAVENOUS at 11:12

## 2022-12-11 RX ADMIN — POTASSIUM BICARBONATE 40 MEQ: 391 TABLET, EFFERVESCENT ORAL at 06:12

## 2022-12-11 RX ADMIN — POTASSIUM CHLORIDE 10 MEQ: 7.46 INJECTION, SOLUTION INTRAVENOUS at 08:12

## 2022-12-11 RX ADMIN — POTASSIUM CHLORIDE 10 MEQ: 7.46 INJECTION, SOLUTION INTRAVENOUS at 07:12

## 2022-12-11 RX ADMIN — MUPIROCIN: 20 OINTMENT TOPICAL at 08:12

## 2022-12-11 RX ADMIN — ENOXAPARIN SODIUM 40 MG: 40 INJECTION SUBCUTANEOUS at 04:12

## 2022-12-11 RX ADMIN — AMPICILLIN SODIUM AND SULBACTAM SODIUM 1.5 G: 1; .5 INJECTION, POWDER, FOR SOLUTION INTRAMUSCULAR; INTRAVENOUS at 04:12

## 2022-12-11 RX ADMIN — SODIUM CHLORIDE, PRESERVATIVE FREE 10 ML: 5 INJECTION INTRAVENOUS at 12:12

## 2022-12-11 RX ADMIN — FAMOTIDINE 20 MG: 10 INJECTION, SOLUTION INTRAVENOUS at 08:12

## 2022-12-11 RX ADMIN — SODIUM CHLORIDE, PRESERVATIVE FREE 10 ML: 5 INJECTION INTRAVENOUS at 05:12

## 2022-12-11 RX ADMIN — POTASSIUM CHLORIDE 10 MEQ: 7.46 INJECTION, SOLUTION INTRAVENOUS at 10:12

## 2022-12-11 RX ADMIN — HYDRALAZINE HYDROCHLORIDE 10 MG: 20 INJECTION INTRAMUSCULAR; INTRAVENOUS at 08:12

## 2022-12-11 RX ADMIN — POTASSIUM CHLORIDE 10 MEQ: 7.46 INJECTION, SOLUTION INTRAVENOUS at 06:12

## 2022-12-11 RX ADMIN — POTASSIUM CHLORIDE 10 MEQ: 7.46 INJECTION, SOLUTION INTRAVENOUS at 09:12

## 2022-12-11 RX ADMIN — LEVETIRACETAM INJECTION 1000 MG: 5 INJECTION INTRAVENOUS at 08:12

## 2022-12-11 RX ADMIN — POTASSIUM PHOSPHATE, MONOBASIC AND POTASSIUM PHOSPHATE, DIBASIC 30 MMOL: 224; 236 INJECTION, SOLUTION, CONCENTRATE INTRAVENOUS at 06:12

## 2022-12-11 RX ADMIN — SODIUM CHLORIDE, PRESERVATIVE FREE 10 ML: 5 INJECTION INTRAVENOUS at 11:12

## 2022-12-11 RX ADMIN — FLUCONAZOLE 100 MG: 100 TABLET ORAL at 08:12

## 2022-12-11 RX ADMIN — VASOPRESSIN 0.01 UNITS/HR: 20 INJECTION, SOLUTION INTRAMUSCULAR; SUBCUTANEOUS at 01:12

## 2022-12-11 RX ADMIN — AMPICILLIN SODIUM AND SULBACTAM SODIUM 1.5 G: 1; .5 INJECTION, POWDER, FOR SOLUTION INTRAMUSCULAR; INTRAVENOUS at 05:12

## 2022-12-11 NOTE — PLAN OF CARE
Problem: Infection  Goal: Absence of Infection Signs and Symptoms  Outcome: Ongoing, Not Progressing     Problem: Fall Injury Risk  Goal: Absence of Fall and Fall-Related Injury  Outcome: Ongoing, Not Progressing     Problem: Adult Inpatient Plan of Care  Goal: Plan of Care Review  Outcome: Ongoing, Not Progressing  Goal: Patient-Specific Goal (Individualized)  Outcome: Ongoing, Not Progressing  Goal: Absence of Hospital-Acquired Illness or Injury  Outcome: Ongoing, Not Progressing  Goal: Optimal Comfort and Wellbeing  Outcome: Ongoing, Not Progressing  Goal: Readiness for Transition of Care  Outcome: Ongoing, Not Progressing     Problem: Communication Impairment (Mechanical Ventilation, Invasive)  Goal: Effective Communication  Outcome: Ongoing, Not Progressing     Problem: Device-Related Complication Risk (Mechanical Ventilation, Invasive)  Goal: Optimal Device Function  Outcome: Ongoing, Not Progressing     Problem: Inability to Wean (Mechanical Ventilation, Invasive)  Goal: Mechanical Ventilation Liberation  Outcome: Ongoing, Not Progressing     Problem: Nutrition Impairment (Mechanical Ventilation, Invasive)  Goal: Optimal Nutrition Delivery  Outcome: Ongoing, Not Progressing     Problem: Skin and Tissue Injury (Mechanical Ventilation, Invasive)  Goal: Absence of Device-Related Skin and Tissue Injury  Outcome: Ongoing, Not Progressing     Problem: Ventilator-Induced Lung Injury (Mechanical Ventilation, Invasive)  Goal: Absence of Ventilator-Induced Lung Injury  Outcome: Ongoing, Not Progressing     Problem: Communication Impairment (Artificial Airway)  Goal: Effective Communication  Outcome: Ongoing, Not Progressing     Problem: Device-Related Complication Risk (Artificial Airway)  Goal: Optimal Device Function  Outcome: Ongoing, Not Progressing     Problem: Skin and Tissue Injury (Artificial Airway)  Goal: Absence of Device-Related Skin or Tissue Injury  Outcome: Ongoing, Not Progressing

## 2022-12-11 NOTE — PROGRESS NOTES
[facility] - ICU  Pulmonary Critical Care Note    Patient Name: Criselda Cannon  MRN: [id number]  Admission Date: 12/7/2022  Hospital Length of Stay: 4 days  Code Status: DNR  Attending Provider: Eliud Peters MD  Primary Care Provider: Primary Doctor No     Subjective:     HPI:   43 yo F with past medical history of anxiety/depression and Bipolar I disorder per chart review who was brought in by neighbor in cardiac arrest. History obtained from ED provider who spoke with individual prior to his departure from ED. Patient has been living in a tent at a local campground and individual has been allowing patient to stay in his van when the weather is bad. The individual left for approximately 20 minutes and upon his return the patient was found unresponsive and not breathing. He began CPR which was performed intermittently until their arrival to ED. Drive from [facility] to ED took approximately 20-30 minutes. Patient with history of drug abuse.     24 Hour Interval History:  Patient still has minimal respiratory drive. She otherwise remains obtunded, mechanically ventilated, not on sedation.  made decision to proceed with organ donation. CAT is on board, helping to adjust orders. Patient now on unasyn and vasopressin per their recommendations, as well as D5 for her hypernatremia. CAT plans for withdrawal and organ procurement possibly as soon as this afternoon.     Past Medical History:   Diagnosis Date    Anxiety     Bipolar 1 disorder     Depression      History reviewed. No pertinent surgical history.    Social History     Socioeconomic History    Marital status: Single   Tobacco Use    Smoking status: Every Day     Packs/day: 1.00     Types: Cigarettes   Substance and Sexual Activity    Alcohol use: No    Drug use: Yes    Sexual activity: Yes     Social Determinants of Health     Financial Resource Strain: Low Risk     Difficulty of Paying Living Expenses: Not hard at all   Food Insecurity:  No Food Insecurity    Worried About Running Out of Food in the Last Year: Never true    Ran Out of Food in the Last Year: Never true   Transportation Needs: No Transportation Needs    Lack of Transportation (Medical): No    Lack of Transportation (Non-Medical): No   Social Connections: Moderately Isolated    Frequency of Communication with Friends and Family: More than three times a week    Frequency of Social Gatherings with Friends and Family: More than three times a week    Attends Sabianist Services: Never    Active Member of Clubs or Organizations: No    Attends Club or Organization Meetings: Never    Marital Status:    Housing Stability: High Risk    Unable to Pay for Housing in the Last Year: No    Number of Places Lived in the Last Year: 4    Unstable Housing in the Last Year: No     Current Outpatient Medications   Medication Instructions    ARIPiprazole (ABILIFY) 5 mg, Daily    clonazePAM (KLONOPIN) 1 mg, 2 times daily PRN    hydrOXYzine HCL (ATARAX) 25 mg, Oral, Every 6 hours    MEDROXYPROGESTERONE ACETATE (DEPO-PROVERA IM) Inject into the muscle.    sertraline (ZOLOFT) 100 mg, Daily    traZODone (DESYREL) 100 mg, Nightly   Current Inpatient Medications   ampicillin-sulbactim (UNASYN) IVPB  1.5 g Intravenous Q6H    enoxaparin  40 mg Subcutaneous Daily    famotidine (PF)  20 mg Intravenous BID    fluconazole  100 mg Oral Daily    levetiracetam IV  1,000 mg Intravenous Q12H    mupirocin   Nasal BID    perflutren protein-a microsphr  2 mL Intravenous Once    potassium chloride  10 mEq Intravenous Q1H    potassium phosphate IVPB  30 mmol Intravenous Once    sodium chloride 0.9%  10 mL Intravenous Q6H   Current Intravenous Infusions   dextrose 5 % 100 mL/hr at 12/11/22 0520    NORepinephrine bitartrate-D5W 0 mcg/kg/min (12/10/22 2304)    propofoL Stopped (12/09/22 0232)    vasopressin infusion CAT only 0.03 Units/hr (12/11/22 0520)       Objective:       Intake/Output Summary (Last 24 hours) at  12/11/2022 1038  Last data filed at 12/11/2022 0607  Gross per 24 hour   Intake 3187.95 ml   Output 5375 ml   Net -2187.05 ml       Vital Signs (Most Recent):  Temp: 97.6 °F (36.4 °C) (12/11/22 0757)  Pulse: 72 (12/11/22 0757)  Resp: (!) 26 (12/11/22 0757)  BP: (!) 168/91 (12/11/22 0757)  SpO2: 99 % (12/11/22 0924)    Body mass index is 24.27 kg/m².  Weight: 60.2 kg (132 lb 11.5 oz) Vital Signs (24h Range):  Temp:  [96.8 °F (36 °C)-99.1 °F (37.3 °C)] 97.6 °F (36.4 °C)  Pulse:  [] 72  Resp:  [26] 26  SpO2:  [95 %-99 %] 99 %  BP: (107-168)/(59-91) 168/91  Arterial Line BP: ()/(44-87) 148/79     Physical Exam  Constitutional:       Interventions: She is intubated.   HENT:      Head: Normocephalic and atraumatic.   Eyes:      Comments: Pupils fixed, dilated.  Bruising around right eye   Cardiovascular:      Rate and Rhythm: Normal rate and regular rhythm.      Pulses: Normal pulses.      Heart sounds: Normal heart sounds.   Pulmonary:      Effort: Pulmonary effort is normal. She is intubated.      Breath sounds: Rales present.   Abdominal:      General: Abdomen is flat. There is no distension.   Neurological:      Comments: Patient with no corneal reflex, no gag reflex, no cough reflex, no withdrawal to painful stimulus     Lines/Drains/Airways       Peripherally Inserted Central Catheter Line  Duration             PICC Triple Lumen 12/07/22 1154 left basilic 3 days              Drain  Duration                  NG/OG Tube 12/07/22 0206 [place] sump 16 Fr. Right nostril 4 days         Urethral Catheter 12/07/22 0220 Double-lumen 16 Fr. 4 days              Airway  Duration                  Airway - Non-Surgical 12/07/22 0206 4 days              Arterial Line  Duration             Arterial Line 12/07/22 1621 Right Radial 3 days              Peripheral Intravenous Line  Duration                  Peripheral IV - Single Lumen 12/07/22 0416 18 G Distal;Left;Posterior Forearm 4 days         Peripheral IV - Single  Lumen 12/07/22 0640 20 G Right Antecubital 4 days                Significant Labs:  Lab Results   Component Value Date    WBC 5.6 12/11/2022    HGB 8.4 (L) 12/11/2022    HCT 25.2 (L) 12/11/2022    MCV 89.7 12/11/2022     12/11/2022   BMP  Lab Results   Component Value Date     (H) 12/11/2022    K 2.6 (LL) 12/11/2022     07/06/2013    CO2 24 12/11/2022    BUN 6.6 (L) 12/11/2022    CREATININE 0.72 12/11/2022    CALCIUM 9.1 12/11/2022    ANIONGAP 14 07/06/2013    ESTGFRAFRICA >60 07/06/2013    EGFRNONAA >60 07/06/2013   ABG  Recent Labs   Lab 12/11/22 0516   PH 7.57*   PO2 97   PCO2 31*   HCO3 28.4*     Mechanical Ventilation Support:  Vent Mode: A/C (12/11/22 0924)  Ventilator Initiated: Yes (12/07/22 0206)  Set Rate: 26 BPM (12/11/22 0924)  Vt Set: 400 mL (12/11/22 0924)  PEEP/CPAP: 5 cmH20 (12/11/22 0924)  Oxygen Concentration (%): 40 (12/11/22 0924)  Peak Airway Pressure: 20.1 cmH20 (12/11/22 0924)  Plateau Pressure: 16.1 cmH20 (12/11/22 0924)  Total Ve: 10.1 L/m (12/11/22 0924)  F/VT Ratio<105 (RSBI): (!) 67.01 (12/11/22 0516)  Significant Imaging:  I have reviewed the pertinent imaging within the past 24 hours.  Assessment/Plan:     Assessment  Out-of-hospital cardiac arrest with ROSC achieved after unknown amount of time   Polysubstance abuse with acute intoxication   Acute hypoxemic respiratory failure   Anoxic brain injury  Possible aspiration pneumonia      Plan  - has made the decision to proceed with organ donation  -Cannot perform official brain death studies over the weekend based on hospital capabilities  -Replenishing electrolytes as needed  -CAT to assist with orders, patient remains on levophed and vasopressin to ensure adequate perfusion to viable organs being donated  -CAT plans on withdrawing care with organ procurement as early as this afternoon pending the availability of their surgeons  -We will be available to pronounce death in OR during this process if  needed      Petey Storm MD  Pulmonary Critical Care Medicine  [facility] - ICU

## 2022-12-12 ENCOUNTER — ANESTHESIA EVENT (OUTPATIENT)
Dept: SURGERY | Facility: HOSPITAL | Age: 42
DRG: 917 | End: 2022-12-12
Payer: MEDICAID

## 2022-12-12 ENCOUNTER — ANESTHESIA (OUTPATIENT)
Dept: SURGERY | Facility: HOSPITAL | Age: 42
DRG: 917 | End: 2022-12-12
Payer: COMMERCIAL

## 2022-12-12 VITALS
TEMPERATURE: 97 F | SYSTOLIC BLOOD PRESSURE: 140 MMHG | DIASTOLIC BLOOD PRESSURE: 95 MMHG | HEIGHT: 62 IN | HEART RATE: 105 BPM | BODY MASS INDEX: 24.42 KG/M2 | OXYGEN SATURATION: 98 % | RESPIRATION RATE: 19 BRPM | WEIGHT: 132.69 LBS

## 2022-12-12 LAB
BACTERIA BLD CULT: NORMAL
BACTERIA BLD CULT: NORMAL
GGT SERPL-CCNC: 76 U/L (ref 9–36)

## 2022-12-12 PROCEDURE — 99238 HOSP IP/OBS DSCHRG MGMT 30/<: CPT | Mod: ,,, | Performed by: INTERNAL MEDICINE

## 2022-12-12 PROCEDURE — 63600175 PHARM REV CODE 636 W HCPCS: Performed by: STUDENT IN AN ORGANIZED HEALTH CARE EDUCATION/TRAINING PROGRAM

## 2022-12-12 PROCEDURE — 37000008 HC ANESTHESIA 1ST 15 MINUTES

## 2022-12-12 PROCEDURE — 99238 PR HOSPITAL DISCHARGE DAY,<30 MIN: ICD-10-PCS | Mod: ,,, | Performed by: INTERNAL MEDICINE

## 2022-12-12 PROCEDURE — 94003 VENT MGMT INPAT SUBQ DAY: CPT

## 2022-12-12 PROCEDURE — 99900035 HC TECH TIME PER 15 MIN (STAT)

## 2022-12-12 PROCEDURE — 27201423 OPTIME MED/SURG SUP & DEVICES STERILE SUPPLY

## 2022-12-12 PROCEDURE — 27000221 HC OXYGEN, UP TO 24 HOURS

## 2022-12-12 PROCEDURE — 88313 SPECIAL STAINS GROUP 2: CPT

## 2022-12-12 PROCEDURE — 36000708 HC OR TIME LEV III 1ST 15 MIN

## 2022-12-12 PROCEDURE — 37000009 HC ANESTHESIA EA ADD 15 MINS

## 2022-12-12 PROCEDURE — 63600175 PHARM REV CODE 636 W HCPCS: Performed by: NURSE ANESTHETIST, CERTIFIED REGISTERED

## 2022-12-12 PROCEDURE — 94761 N-INVAS EAR/PLS OXIMETRY MLT: CPT

## 2022-12-12 PROCEDURE — 36000709 HC OR TIME LEV III EA ADD 15 MIN

## 2022-12-12 RX ORDER — MANNITOL 250 MG/ML
INJECTION, SOLUTION INTRAVENOUS
Status: DISCONTINUED | OUTPATIENT
Start: 2022-12-12 | End: 2022-12-12

## 2022-12-12 RX ORDER — HEPARIN SODIUM 1000 [USP'U]/ML
INJECTION, SOLUTION INTRAVENOUS; SUBCUTANEOUS
Status: DISCONTINUED | OUTPATIENT
Start: 2022-12-12 | End: 2022-12-12

## 2022-12-12 RX ADMIN — HEPARIN SODIUM 30000 UNITS: 1000 INJECTION, SOLUTION INTRAVENOUS; SUBCUTANEOUS at 03:12

## 2022-12-12 RX ADMIN — MANNITOL 25 G: 12.5 INJECTION, SOLUTION INTRAVENOUS at 02:12

## 2022-12-12 RX ADMIN — POTASSIUM CHLORIDE 10 MEQ: 7.46 INJECTION, SOLUTION INTRAVENOUS at 12:12

## 2022-12-12 NOTE — NURSING
FINAL NOTE:  Patient taken to OR for planned organ procurement at 0230 AM. Patient placed on portable ventilator and was accompanied by 2 RNs,1 respiratory therapist, and CAT representative. No complications occurred during the trip to the ER.

## 2022-12-12 NOTE — ANESTHESIA PREPROCEDURE EVALUATION
12/12/2022  Criselda Cannon is a 42 y.o., female with drug overdose and brain death for organ procurement.    I evaluated the pt on the afternoon of 12/11/22 in the ICU. My services were ultimately not required and I was not present for the procedure.   However, to allow for charting in Epic I was required to be named as a participant.  Likewise in order to close out the chart I had to attest to being present.    Pre-op Assessment          Review of Systems      Physical Exam  General: Well nourished    Airway:  Pre-Existing Airway: Oral Endotracheal tube    Chest/Lungs:  Clear to auscultation, Normal Respiratory Rate    Heart:  Rate: Normal  Rhythm: Regular Rhythm        Anesthesia Plan  Type of Anesthesia, risks & benefits discussed:    Anesthesia Type: Gen ETT  Intra-op Monitoring Plan: Standard ASA Monitors  Post Op Pain Control Plan: multimodal analgesia and IV/PO Opioids PRN  Induction:  IV  Airway Plan: Direct  Informed Consent: Informed consent signed with the Patient and all parties understand the risks and agree with anesthesia plan.  All questions answered.   ASA Score: 6 Emergent  Day of Surgery Review of History & Physical: H&P Update referred to the surgeon/provider.I have interviewed and examined the patient. I have reviewed the patient's H&P dated: There are no significant changes. H&P completed by Anesthesiologist.    Ready For Surgery From Anesthesia Perspective.     .

## 2022-12-12 NOTE — DISCHARGE SUMMARY
[facility] Death Note    I was paged by CAT to attend DCD in OR [address]. The patient initially suffered on 12/7/22 s/p polysubstance overdose with out of hospital cardiac arrest for unknown amount of time prior to ROSC in the field. Patient was transported to [facility] ED and admitted to ICU. Unfortunately, she was found to have suffered a severe anoxic brain injury without chance of meaningful recovery, and had fixed dilated pupils, no gag or cough reflexes, no oculocephalic reflex, and did not withdraw to painful stimuli with decerebrate posturing. She was initially breathing over the vent, but her brain function continued to worsen throughout her stay. On 12/10 her  agreed to pursue organ donation through [facility]. By this afternoon, patient was no longer breathing spontaneously when the vent was paused, and decision was made to extubate for DCD once transplant surgeons available. Early this morning, the patient was brought down to the OR at 0230, and with OR staff present extubated by CRNA at 0307. She slowly became bradycardic with lowered blood pressure and O2 sats until she cardiac arrested at 0329. Exam was performed as below.       Pronunciation of death was made after observing the following:   - no cardiac electrical activity observed on the monitor   - pupils are fixed and dilated   - no carotid pulse present   - absence of corneal reflex           Time of Death: 0329       Cause of Death: respiratory failure secondary to anoxic brain injury from polysubstance drug overdose    Renee Baker MD  [facility] PGY II

## 2022-12-12 NOTE — ANESTHESIA POSTPROCEDURE EVALUATION
Anesthesia Post Evaluation    Patient: Criselda Cannon    Procedure(s) Performed: Procedure(s) (LRB):  SURGICAL PROCUREMENT, ORGAN (N/A)    Final Anesthesia Type: other      Patient participation: No - Unable to Participate, Other Reason (see comments)  Post-procedure mental status: pt  after extubation.      Anesthetic complications: no                Vitals Value Taken Time   BP NA 22 0657   Temp NA 22 0657   Pulse NA 22 0657   Resp NA 22 0657   SpO2 NA 22 0657         No case tracking events are documented in the log.      Pain/Nita Score: No data recorded

## 2022-12-13 LAB
BACTERIA SPT CULT: ABNORMAL
BACTERIA UR CULT: NO GROWTH
GRAM STN SPEC: ABNORMAL
GRAM STN SPEC: ABNORMAL
NSE SERPL-MCNC: 161 NG/ML

## 2022-12-14 LAB
DHEA SERPL-MCNC: NORMAL
ESTROGEN SERPL-MCNC: NORMAL PG/ML
INSULIN SERPL-ACNC: NORMAL U[IU]/ML
LAB AP CLINICAL INFORMATION: NORMAL
LAB AP GROSS DESCRIPTION: NORMAL
LAB AP REPORT FOOTNOTES: NORMAL
T3RU NFR SERPL: NORMAL %

## 2022-12-16 LAB
BACTERIA BLD CULT: NORMAL
BACTERIA BLD CULT: NORMAL

## 2023-08-16 LAB
INR PPP: 1.1
INR PPP: 1.1
INR PPP: 1.2
INR PPP: 1.3
INR PPP: 1.3
INR PPP: 1.7
PROTHROMBIN TIME: 14.1 SECONDS (ref 11.4–14)
PROTHROMBIN TIME: 14.1 SECONDS (ref 11.4–14)
PROTHROMBIN TIME: 14.6 SECONDS (ref 11.4–14)
PROTHROMBIN TIME: 14.7 SECONDS (ref 11.4–14)
PROTHROMBIN TIME: 14.8 SECONDS (ref 11.4–14)
PROTHROMBIN TIME: 15.3 SECONDS (ref 11.4–14)
PROTHROMBIN TIME: 15.6 SECONDS (ref 11.4–14)
PROTHROMBIN TIME: 16.1 SECONDS (ref 11.4–14)
PROTHROMBIN TIME: 19.1 SECONDS (ref 11.4–14)

## (undated) DEVICE — GLOVE PROTEXIS HYDROGEL SZ6

## (undated) DEVICE — SUT PROLENE 4-0 SH BLU 36IN

## (undated) DEVICE — GLOVE PROTEXIS LTX MICRO 6

## (undated) DEVICE — GLOVE PROTEXIS BLUE LATEX 7.5

## (undated) DEVICE — HANDLE DEVON RIGID OR LIGHT

## (undated) DEVICE — BLADE SAW STERN .076MM 6.1MM L

## (undated) DEVICE — GLOVE PROTEXIS BLUE LATEX 7

## (undated) DEVICE — MARKER WRITESITE SKIN CHLRAPRP

## (undated) DEVICE — GLOVE PROTEXIS LTX MICRO  7.5

## (undated) DEVICE — GLOVE PROTEXIS HYDROGEL SZ6.5

## (undated) DEVICE — GLOVE PROTEXIS NEU-THERA SZ6

## (undated) DEVICE — GLOVE PROTEXIS BLUE LATEX 6.5